# Patient Record
Sex: MALE | Race: WHITE | NOT HISPANIC OR LATINO | Employment: OTHER | URBAN - METROPOLITAN AREA
[De-identification: names, ages, dates, MRNs, and addresses within clinical notes are randomized per-mention and may not be internally consistent; named-entity substitution may affect disease eponyms.]

---

## 2017-02-13 ENCOUNTER — ALLSCRIPTS OFFICE VISIT (OUTPATIENT)
Dept: OTHER | Facility: OTHER | Age: 67
End: 2017-02-13

## 2017-02-16 ENCOUNTER — GENERIC CONVERSION - ENCOUNTER (OUTPATIENT)
Dept: OTHER | Facility: OTHER | Age: 67
End: 2017-02-16

## 2017-03-15 ENCOUNTER — GENERIC CONVERSION - ENCOUNTER (OUTPATIENT)
Dept: OTHER | Facility: OTHER | Age: 67
End: 2017-03-15

## 2017-06-01 DIAGNOSIS — N18.2 CHRONIC KIDNEY DISEASE, STAGE II (MILD): ICD-10-CM

## 2017-06-05 ENCOUNTER — ALLSCRIPTS OFFICE VISIT (OUTPATIENT)
Dept: OTHER | Facility: OTHER | Age: 67
End: 2017-06-05

## 2017-10-02 DIAGNOSIS — N18.2 CHRONIC KIDNEY DISEASE, STAGE II (MILD): ICD-10-CM

## 2017-12-05 ENCOUNTER — ALLSCRIPTS OFFICE VISIT (OUTPATIENT)
Dept: OTHER | Facility: OTHER | Age: 67
End: 2017-12-05

## 2017-12-06 NOTE — PROGRESS NOTES
Assessment  1  Chronic kidney disease (CKD), stage II (mild) (585 2) (N18 2)   2  Hypertension (401 9) (I10)   3  Proteinuria (791 0) (R80 9)    Plan  Chronic kidney disease (CKD), stage II (mild)    · (1) BASIC METABOLIC PROFILE; Status:Active; Requested KRISTIN:[id number]; Perform:[facility]; VWS:[id number]; For:Chronic kidney disease (CKD), stage II (mild); Ordered By:Christopher Alex;   · (1) URINE PROTEIN CREATININE RATIO; Status:Active; Requested BUR:[id number]; Perform:[facility]; PJI:[id number];YZMKWDK;UDVFAJ disease (CKD), stage II (mild); Ordered By:Dratch, Griselda Parkin;    As you have inform you had successful back surgery under doing well with respect to less pain  Your blood pressure also is very well controlled on her current regimen you can try reduce labetalol to twice a day and continue Lotrel as you are doing  Obtain lab work and I will contact you with the results of your kidney function and electrolytes  Discussion/Summary   patient is 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 as he had severe hypertension with minimal proteinuria  His latest creatinine was done in the hospital and he recalls about a 2 6 but he is unsure what it was on discharge  He is going to have labs done in the near future I will contact him with the results to make sure he is at his baseline  He is not taking any nonsteroidal anti-inflammatories  With respect to his blood pressure it is improved he did have labetalol added and in the past we have not used beta-blockers due to bronchospasm but he says he has no side effects from this  He will continue on trying reduce this to twice a day as his blood pressures been excellent and continue Lotrel  Follow-up in 3-4 months continue to monitor and I told him to call if there is any problems in between his visit  Reason For Visit  You are here for follow-up to monitor kidney function and blood pressure        History of Present Illness  Patient is here for routine follow-up of chronic kidney disease and hypertension  Since I had last seen him he did undergo spinal fusion and has had success with no more pain or numbness  He is currently receiving physical therapy but his pain is gone  He walks with a walker just as he is getting better from rehab  Other than that no complaints  Review of Systems   Constitutional: no fever,-- no chills,-- no anorexia-- and-- no fatigue  Integumentary: no rashes  Gastrointestinal: no abdominal pain,-- no nausea,-- no diarrhea-- and-- no vomiting  Respiratory: no shortness of breath,-- no cough-- and-- not coughing up sputum  Cardiovascular: no orthopnea,-- no chest pain,-- no palpitations-- and-- no lower extremity edema  Musculoskeletal: Back pain improved-- and-- Legs go numb if he walks distances  Neurological: No complaints of headache, no lightheadedness or dizziness  Genitourinary: nocturia, but-- no dysuria,-- no hematuria-- and-- no incomplete emptying of bladder  Eyes: eyesight problems-- and-- Issues with right eye being treated by ophthalmology  ENT: Nasal congestion  Current Meds   1  Advair Diskus 250-50 MCG/DOSE Inhalation Aerosol Powder Breath Activated; INHALE 1 PUFF EVERY 12 HOURS; Therapy: (Recorded:29Oct2015) to Recorded   2  Allegra Allergy TABS; PRN Recorded   3  Amlodipine Besy-Benazepril HCl - 10-20 MG Oral Capsule; TAKE 1 CAPSULE DAILY  Requested for: [id number]; Last Rx:04Oct2017 Ordered   4  Aspirin 81 MG TABS; TAKE 1 TABLET DAILY; Therapy: (Recorded:29Oct2015) to Recorded   5  DiazePAM 5 MG Oral Tablet; Take 1 tablet daily; Therapy: 92GOI0193 to Recorded   6  DrRx Flexeril 10 MG #30; PRN Recorded   7  Labetalol HCl - 200 MG Oral Tablet; Take 1 tablet daily; Therapy: 04OJA1803 to Recorded   8  Lipitor 10 MG Oral Tablet; Take 1 tablet qod; Therapy: 28PJW3631 to Recorded   9  Multivitamins Oral Capsule; TAKE 1 CAPSULE DAILY;  Therapy: (Recorded:59Iwj2228) to Recorded   10  OxyCODONE HCl - 5 MG Oral Tablet; Take 1 tablet daily; Therapy: 17KDD3637 to Recorded   11  TraMADol HCl - 50 MG Oral Tablet; TAKE 1 TABLET DAILY AS NEEDED FOR PAIN;  Therapy: (Recorded:45Szr6702) to Recorded   12  Vitamin C 1000 MG Oral Tablet; TAKE 1 TABLET DAILY; Therapy: (Recorded:28Bjy8838) to Recorded   13  Vitamin D 2000 UNIT Oral Capsule; OD;  Therapy: 23JHZ3381 to Recorded   14  Xanax 0 25 MG Oral Tablet; TAKE 1 TABLET DAILY AS NEEDED; Therapy: 72GXX6152 to Recorded    The medication list was reviewed and updated today  Allergies  1  Beta Adrenergic Blockers   2  Clonidine and derivs   3  doxazosin   4  HydroCHLOROthiazide TABS    Vitals  Vital Signs    Recorded: 71GOQ4099 04:29PM Recorded: 36XXR0335 03:45PM   Heart Rate 70    Respiration 16    Systolic 452    Diastolic 70    Height  5 ft 7 in   Weight  190 lb    BMI Calculated  29 76   BSA Calculated  1 98       Physical Exam   Constitutional: General appearance: No acute distress, well appearing and well nourished  ENT: External ears and nose appear normal     Eyes: Anicteric sclerae  JVD:  No JVD present  Pulmonary: Respiratory effort: No increased work of breathing or signs of respiratory distress  -- Auscultation of lungs: Clear to auscultation  Cardiovascular: Auscultation of heart: Normal rate and rhythm, normal S1 and S2, without murmurs  Abdomen: Non-tender, no masses  Extremities: No cyanosis, clubbing or edema  Neurologic: Non Focal     Psychiatric: Orientation to person, place, and time: Normal        Signatures   Electronically signed by :  CECI Juan ; Dec  5 2017  4:38PM EST                       (Author)

## 2018-01-13 VITALS
SYSTOLIC BLOOD PRESSURE: 160 MMHG | HEIGHT: 67 IN | RESPIRATION RATE: 18 BRPM | HEART RATE: 80 BPM | BODY MASS INDEX: 32.49 KG/M2 | DIASTOLIC BLOOD PRESSURE: 88 MMHG | WEIGHT: 207 LBS

## 2018-01-14 VITALS
RESPIRATION RATE: 18 BRPM | SYSTOLIC BLOOD PRESSURE: 158 MMHG | BODY MASS INDEX: 31.55 KG/M2 | DIASTOLIC BLOOD PRESSURE: 90 MMHG | HEART RATE: 80 BPM | WEIGHT: 201 LBS | HEIGHT: 67 IN

## 2018-01-23 VITALS
SYSTOLIC BLOOD PRESSURE: 138 MMHG | WEIGHT: 190 LBS | RESPIRATION RATE: 16 BRPM | HEART RATE: 70 BPM | BODY MASS INDEX: 29.82 KG/M2 | HEIGHT: 67 IN | DIASTOLIC BLOOD PRESSURE: 70 MMHG

## 2018-01-23 NOTE — CONSULTS
Reason For Visit  You are here for follow-up to monitor kidney function and blood pressure  History of Present Illness  Patient is here for routine follow-up of chronic kidney disease and hypertension  Since I had last seen him he did undergo spinal fusion and has had success with no more pain or numbness  He is currently receiving physical therapy but his pain is gone  He walks with a walker just as he is getting better from rehab  Other than that no complaints  Review of Systems    Constitutional: no fever, no chills, no anorexia and no fatigue  Integumentary: no rashes  Gastrointestinal: no abdominal pain, no nausea, no diarrhea and no vomiting  Respiratory: no shortness of breath, no cough and not coughing up sputum  Cardiovascular: no orthopnea, no chest pain, no palpitations and no lower extremity edema  Musculoskeletal: Back pain improved and Legs go numb if he walks distances  Neurological: No complaints of headache, no lightheadedness or dizziness  Genitourinary: nocturia, but no dysuria, no hematuria and no incomplete emptying of bladder  Eyes: eyesight problems and Issues with right eye being treated by ophthalmology  ENT: Nasal congestion  Current Meds   1  Advair Diskus 250-50 MCG/DOSE Inhalation Aerosol Powder Breath Activated; INHALE 1   PUFF EVERY 12 HOURS; Therapy: (Recorded:29Oct2015) to Recorded   2  Allegra Allergy TABS; PRN Recorded   3  Amlodipine Besy-Benazepril HCl - 10-20 MG Oral Capsule; TAKE 1 CAPSULE DAILY    Requested for: [id number]; Last Rx:04Oct2017 Ordered   4  Aspirin 81 MG TABS; TAKE 1 TABLET DAILY; Therapy: (Recorded:29Oct2015) to Recorded   5  DiazePAM 5 MG Oral Tablet; Take 1 tablet daily; Therapy: 63MLO6662 to Recorded   6  DrRx Flexeril 10 MG #30; PRN Recorded   7  Labetalol HCl - 200 MG Oral Tablet; Take 1 tablet daily; Therapy: 79CDZ9411 to Recorded   8  Lipitor 10 MG Oral Tablet; Take 1 tablet qod;    Therapy: 70RHF3373 to Recorded   9  Multivitamins Oral Capsule; TAKE 1 CAPSULE DAILY; Therapy: (Recorded:18Qpk8966) to Recorded   10  OxyCODONE HCl - 5 MG Oral Tablet; Take 1 tablet daily; Therapy: 54NXQ5714 to Recorded   11  TraMADol HCl - 50 MG Oral Tablet; TAKE 1 TABLET DAILY AS NEEDED FOR PAIN;    Therapy: (Recorded:09Syh8182) to Recorded   12  Vitamin C 1000 MG Oral Tablet; TAKE 1 TABLET DAILY; Therapy: (Recorded:93Gjq6149) to Recorded   13  Vitamin D 2000 UNIT Oral Capsule; OD;    Therapy: 86NDN9809 to Recorded   14  Xanax 0 25 MG Oral Tablet; TAKE 1 TABLET DAILY AS NEEDED; Therapy: 01UTW5131 to Recorded    The medication list was reviewed and updated today  Allergies    1  Beta Adrenergic Blockers   2  Clonidine and derivs   3  doxazosin   4  HydroCHLOROthiazide TABS    Vitals   Recorded: 92SRD3702 04:29PM Recorded: 42MNK1238 03:45PM   Heart Rate 70    Respiration 16    Systolic 132    Diastolic 70    Height  5 ft 7 in   Weight  190 lb    BMI Calculated  29 76   BSA Calculated  1 98     Physical Exam    Constitutional: General appearance: No acute distress, well appearing and well nourished  ENT: External ears and nose appear normal      Eyes: Anicteric sclerae  JVD:  No JVD present  Pulmonary: Respiratory effort: No increased work of breathing or signs of respiratory distress  Auscultation of lungs: Clear to auscultation  Cardiovascular: Auscultation of heart: Normal rate and rhythm, normal S1 and S2, without murmurs  Abdomen: Non-tender, no masses  Extremities: No cyanosis, clubbing or edema  Neurologic: Non Focal      Psychiatric: Orientation to person, place, and time: Normal        Assessment    1  Chronic kidney disease (CKD), stage II (mild) (585 2) (N18 2)   2  Hypertension (401 9) (I10)   3  Proteinuria (791 0) (R80 9)    Plan  Chronic kidney disease (CKD), stage II (mild)    · (1) BASIC METABOLIC PROFILE; Status:Active; Requested QNB:[id number];     Perform:[facility]; DFU:[id number]; For:Chronic kidney disease (CKD), stage II (mild); Ordered By:Christopher Alex;   · (1) URINE PROTEIN CREATININE RATIO; Status:Active; Requested GFK:[id number]; Perform:[facility]; JSZ:[id number];CMFUVKL; For:Chronic kidney disease (CKD), stage II (mild); Ordered By:Emma Alex;      As you have inform you had successful back surgery under doing well with respect to less pain  Your blood pressure also is very well controlled on her current regimen you can try reduce labetalol to twice a day and continue Lotrel as you are doing  Obtain lab work and I will contact you with the results of your kidney function and electrolytes  Discussion/Summary   patient is 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 as he had severe hypertension with minimal proteinuria  His latest creatinine was done in the hospital and he recalls about a 2 6 but he is unsure what it was on discharge  He is going to have labs done in the near future I will contact him with the results to make sure he is at his baseline  He is not taking any nonsteroidal anti-inflammatories  With respect to his blood pressure it is improved he did have labetalol added and in the past we have not used beta-blockers due to bronchospasm but he says he has no side effects from this  He will continue on trying reduce this to twice a day as his blood pressures been excellent and continue Lotrel  Follow-up in 3-4 months continue to monitor and I told him to call if there is any problems in between his visit  Signatures   Electronically signed by :  CECI Barr ; Dec  5 2017  4:38PM EST                       (Author)

## 2018-03-01 DIAGNOSIS — N18.2 CHRONIC KIDNEY DISEASE, STAGE II (MILD): ICD-10-CM

## 2018-03-16 LAB
BUN SERPL-MCNC: 28 MG/DL (ref 5–25)
CALCIUM SERPL-MCNC: 9.1 MG/DL (ref 8.3–10.1)
CHLORIDE SERPL-SCNC: 107 MMOL/L (ref 100–108)
CHOLEST SERPL-MCNC: 175 MG/DL (ref 50–200)
CO2 SERPL-SCNC: 21 MMOL/L
CREAT SERPL-MCNC: 2.54 MG/DL (ref 0.6–1.3)
GLUCOSE SERPL-MCNC: 105 MG/DL
LDLC SERPL DIRECT ASSAY-MCNC: 107 MG/DL
POTASSIUM SERPL-SCNC: 4.4 MMOL/L (ref 3.5–5.3)
SODIUM SERPL-SCNC: 140 MMOL/L (ref 136–145)
TRIGL SERPL-MCNC: 157 MG/DL (ref ?–150)

## 2018-03-19 ENCOUNTER — OFFICE VISIT (OUTPATIENT)
Dept: NEPHROLOGY | Facility: CLINIC | Age: 68
End: 2018-03-19
Payer: COMMERCIAL

## 2018-03-19 VITALS — HEIGHT: 66 IN | WEIGHT: 203.4 LBS | BODY MASS INDEX: 32.69 KG/M2

## 2018-03-19 DIAGNOSIS — I10 HYPERTENSION, UNSPECIFIED TYPE: ICD-10-CM

## 2018-03-19 DIAGNOSIS — N18.30 CKD (CHRONIC KIDNEY DISEASE) STAGE 3, GFR 30-59 ML/MIN (HCC): Primary | ICD-10-CM

## 2018-03-19 PROCEDURE — 99214 OFFICE O/P EST MOD 30 MIN: CPT | Performed by: INTERNAL MEDICINE

## 2018-03-19 RX ORDER — MULTIVITAMIN
1 CAPSULE ORAL DAILY
COMMUNITY

## 2018-03-19 RX ORDER — ALPRAZOLAM 0.25 MG/1
1 TABLET ORAL DAILY PRN
COMMUNITY
Start: 2014-06-10

## 2018-03-19 RX ORDER — LABETALOL 200 MG/1
1 TABLET, FILM COATED ORAL 2 TIMES DAILY
COMMUNITY
Start: 2017-12-05 | End: 2018-11-23 | Stop reason: SDUPTHER

## 2018-03-19 RX ORDER — AMLODIPINE BESYLATE AND BENAZEPRIL HYDROCHLORIDE 10; 20 MG/1; MG/1
1 CAPSULE ORAL DAILY
Qty: 90 CAPSULE | Refills: 3 | Status: SHIPPED | OUTPATIENT
Start: 2018-03-19 | End: 2019-05-16 | Stop reason: SDUPTHER

## 2018-03-19 RX ORDER — ATORVASTATIN CALCIUM 10 MG/1
1 TABLET, FILM COATED ORAL
COMMUNITY
Start: 2014-06-10

## 2018-03-19 RX ORDER — DIAZEPAM 5 MG/1
1 TABLET ORAL DAILY
COMMUNITY
Start: 2017-12-05 | End: 2018-03-19 | Stop reason: CLARIF

## 2018-03-19 RX ORDER — MULTIVIT WITH MINERALS/LUTEIN
1 TABLET ORAL DAILY
COMMUNITY

## 2018-03-19 RX ORDER — AMLODIPINE BESYLATE AND BENAZEPRIL HYDROCHLORIDE 10; 20 MG/1; MG/1
1 CAPSULE ORAL DAILY
COMMUNITY
End: 2018-03-19 | Stop reason: SDUPTHER

## 2018-03-19 RX ORDER — FEXOFENADINE HYDROCHLORIDE 60 MG/1
TABLET, FILM COATED ORAL AS NEEDED
COMMUNITY

## 2018-03-19 NOTE — PATIENT INSTRUCTIONS
As we discussed your creatinine was 2 5 and that has been stable for many years although it has fluctuated around that level  There is still not a lot of protein in the urine which tells me there is no aggressive kidney disease and I suspect that given her severe hypertension for many years that is likely the cause of the kidney damage  Now the blood pressure is better controlled that should delay damage  Continue current medications blood pressure was a little elevated here but it sounds like it has been excellent at home checking very frequently so continue current medications

## 2018-03-19 NOTE — PROGRESS NOTES
NEPHROLOGY PROGRESS NOTE    Katherine Machado 74 y o  male MRN: [id number]  Unit/Bed#:  Encounter: [id number]  Reason for Consult:  Chronic renal insufficiency    Patient is here for routine follow-up  He has been doing well states his blood pressures been excellent  He also has had resolution of the severe back pain and is really excited  He states he has been undergoing some therapy as well  No other changes  ASSESSMENT/PLAN:  1  Renal     Patient's chronic kidney disease and creatinine stable 2 5 and it has been in that range for years that I followed him  He has a ways had very low levels of proteinuria so I suspect he just had hypertensive nephrosclerosis as he had severe very difficult to control blood pressure for many years  Saying that now that his blood pressure is under good control should help delay progression although it has been stable  Continue to monitor  2   Hypertension  Patient's blood pressure is much improved on his current medical regimen and after his back surgery now that the pain is improved  The patient was started on labetalol by cardiologist at another facility and he is tolerating that is all removed beta-blocker from his allergy list   He really just did not tolerate a beta-blocker earlier in our medical trials  He monitors blood pressure at home and it tends run systolic 196 or lower so continue current medications and follow  SUBJECTIVE:  Review of Systems   Constitution: Negative  HENT: Negative  Eyes: Negative  Cardiovascular: Negative  Respiratory: Negative  Gastrointestinal: Negative  Genitourinary: Negative  Neurological: Negative  OBJECTIVE:  Current Weight: Weight - Scale: 92 3 kg (203 lb 6 4 oz)  [email or web address]:     Height 5' 6" (1 676 m), weight 92 3 kg (203 lb 6 4 oz)  , Body mass index is 32 83 kg/m²      [unfilled]    Physical Exam: Ht 5' 6" (1 676 m)   Wt 92 3 kg (203 lb 6 4 oz)   BMI 32 83 kg/m²   Physical Exam Constitutional: He is oriented to person, place, and time  He appears well-nourished  No distress  HENT:   Head: Atraumatic  Mouth/Throat: No oropharyngeal exudate  Eyes: Conjunctivae are normal  Pupils are equal, round, and reactive to light  No scleral icterus  Neck: Normal range of motion  Neck supple  No JVD present  Cardiovascular: Normal rate and regular rhythm  Exam reveals no friction rub  No significant edema  Pulmonary/Chest: Effort normal and breath sounds normal  No respiratory distress  He has no wheezes  He has no rales  Abdominal: Soft  Bowel sounds are normal  He exhibits no distension  There is no tenderness  There is no rebound  Neurological: He is alert and oriented to person, place, and time         Medications:    Current Outpatient Prescriptions:     ALPRAZolam (XANAX) 0 25 mg tablet, Take 1 tablet by mouth daily as needed, Disp: , Rfl:     amLODIPine-benazepril (LOTREL) 10-20 MG per capsule, Take 1 capsule by mouth daily, Disp: 90 capsule, Rfl: 3    Ascorbic Acid (VITAMIN C) 1000 MG tablet, Take 1 tablet by mouth daily, Disp: , Rfl:     aspirin 81 MG tablet, Take 1 tablet by mouth daily, Disp: , Rfl:     atorvastatin (LIPITOR) 10 mg tablet, Take 1 tablet by mouth, Disp: , Rfl:     fexofenadine (ALLEGRA) 60 MG tablet, Take by mouth, Disp: , Rfl:     fluticasone-salmeterol (ADVAIR DISKUS) 250-50 mcg/dose inhaler, Inhale 1 puff every 12 (twelve) hours, Disp: , Rfl:     labetalol (NORMODYNE) 200 mg tablet, Take 1 tablet by mouth 2 (two) times a day , Disp: , Rfl:     Multiple Vitamin (MULTIVITAMIN) capsule, Take 1 capsule by mouth daily, Disp: , Rfl:     traMADol-acetaminophen (ULTRACET) 37 5-325 mg per tablet, Take 1 tablet by mouth every 6 (six) hours as needed, Disp: , Rfl: 0    Laboratory Results:  No results found for: WBC, HGB, HCT, MCV, PLT  Lab Results   Component Value Date    CALCIUM 9 1 03/09/2018     03/09/2018    K 4 4 03/09/2018     03/09/2018    BUN 28 (A) 03/09/2018    CREATININE 2 54 (A) 03/09/2018     Lab Results   Component Value Date    CALCIUM 9 1 03/09/2018     No results found for: LABPROT

## 2018-09-06 ENCOUNTER — OFFICE VISIT (OUTPATIENT)
Dept: NEPHROLOGY | Facility: CLINIC | Age: 68
End: 2018-09-06
Payer: COMMERCIAL

## 2018-09-06 VITALS
HEIGHT: 66 IN | WEIGHT: 208 LBS | BODY MASS INDEX: 33.43 KG/M2 | DIASTOLIC BLOOD PRESSURE: 80 MMHG | SYSTOLIC BLOOD PRESSURE: 138 MMHG

## 2018-09-06 DIAGNOSIS — N18.30 CKD (CHRONIC KIDNEY DISEASE) STAGE 3, GFR 30-59 ML/MIN (HCC): ICD-10-CM

## 2018-09-06 DIAGNOSIS — I10 HYPERTENSION, UNSPECIFIED TYPE: Primary | ICD-10-CM

## 2018-09-06 PROCEDURE — 99214 OFFICE O/P EST MOD 30 MIN: CPT | Performed by: INTERNAL MEDICINE

## 2018-09-06 RX ORDER — CYCLOBENZAPRINE HCL 10 MG
10 TABLET ORAL DAILY PRN
COMMUNITY

## 2018-09-06 NOTE — PROGRESS NOTES
NEPHROLOGY PROGRESS NOTE    Joshua Chacon 74 y o  male MRN: [id number]  Unit/Bed#:  Encounter: [id number]  Reason for Consult:   Hypertension and chronic kidney disease    The patient is doing well as back pain is improved but he still having issues with numbness on his left leg at times  Other than that he is enjoying life with his grandson  He has had no intercurrent illnesses he is tolerating his medications  ASSESSMENT/PLAN:  1  Renal  The patient is chronic kidney disease creatinine increased to 2 7 potentially related to mild effective volume depletion from heat verses mild progression  Will continue to follow over the years he has hypertensive nephrosclerosis given lack of significant proteinuria  Continue to monitor  2   Hypertension  Patient's blood pressure is excellent on labetalol and Lotrel  He is tolerating these well without any complications or side effects  The ACE-inhibitor may cause slight fluctuations in creatinine levels  Continue to monitor  Since his back surgery his blood pressures been much easier to control as well  SUBJECTIVE:  Review of Systems   Constitution: Negative for chills and fever  HENT: Negative  Occasional sinus congestion  Eyes: Negative  Cardiovascular: Negative  Negative for chest pain, dyspnea on exertion and orthopnea  Respiratory: Negative  Negative for cough and shortness of breath  Skin: Negative  Musculoskeletal:        Back pain leg numbness at times  Gastrointestinal: Negative  Genitourinary: Negative  Neurological: Negative  OBJECTIVE:  Current Weight: Weight - Scale: 94 3 kg (208 lb)  [email or web address]:     Blood pressure 138/80, height 5' 6" (1 676 m), weight 94 3 kg (208 lb)  , Body mass index is 33 57 kg/m²      [unfilled]    Physical Exam: /80 (BP Location: Right arm, Patient Position: Sitting, Cuff Size: Standard)   Ht 5' 6" (1 676 m)   Wt 94 3 kg (208 lb)   BMI 33 57 kg/m²   Physical Exam   Constitutional: He is oriented to person, place, and time  He appears well-nourished  No distress  HENT:   Head: Atraumatic  Mouth/Throat: No oropharyngeal exudate  Eyes: Conjunctivae are normal  No scleral icterus  Neck: Neck supple  No JVD present  Cardiovascular: Normal rate and regular rhythm  Exam reveals no friction rub  No edema  Pulmonary/Chest: Effort normal and breath sounds normal  No respiratory distress  He has no wheezes  He has no rales  Abdominal: Soft  Bowel sounds are normal  He exhibits no distension  There is no tenderness  There is no rebound  Neurological: He is alert and oriented to person, place, and time         Medications:    Current Outpatient Prescriptions:     ALPRAZolam (XANAX) 0 25 mg tablet, Take 1 tablet by mouth daily as needed, Disp: , Rfl:     amLODIPine-benazepril (LOTREL) 10-20 MG per capsule, Take 1 capsule by mouth daily, Disp: 90 capsule, Rfl: 3    Ascorbic Acid (VITAMIN C) 1000 MG tablet, Take 1 tablet by mouth daily, Disp: , Rfl:     aspirin 81 MG tablet, Take 1 tablet by mouth daily, Disp: , Rfl:     atorvastatin (LIPITOR) 10 mg tablet, Take 1 tablet by mouth, Disp: , Rfl:     cyclobenzaprine (FLEXERIL) 10 mg tablet, Take 10 mg by mouth 3 (three) times a day as needed for muscle spasms, Disp: , Rfl:     fexofenadine (ALLEGRA) 60 MG tablet, Take by mouth, Disp: , Rfl:     fluticasone-salmeterol (ADVAIR DISKUS) 250-50 mcg/dose inhaler, Inhale 1 puff every 12 (twelve) hours, Disp: , Rfl:     labetalol (NORMODYNE) 200 mg tablet, Take 1 tablet by mouth 2 (two) times a day , Disp: , Rfl:     Multiple Vitamin (MULTIVITAMIN) capsule, Take 1 capsule by mouth daily, Disp: , Rfl:     traMADol-acetaminophen (ULTRACET) 37 5-325 mg per tablet, Take 1 tablet by mouth every 6 (six) hours as needed, Disp: , Rfl: 0    Laboratory Results:  No results found for: WBC, HGB, HCT, MCV, PLT  Lab Results   Component Value Date    CALCIUM 9 1 03/09/2018    NA 140 03/09/2018    K 4 4 03/09/2018    CO2 21 03/09/2018     03/09/2018    BUN 28 (A) 03/09/2018    CREATININE 2 54 (A) 03/09/2018     Lab Results   Component Value Date    CALCIUM 9 1 03/09/2018     No results found for: LABPROT

## 2018-11-23 DIAGNOSIS — I10 HYPERTENSION, UNSPECIFIED TYPE: Primary | ICD-10-CM

## 2018-11-23 RX ORDER — LABETALOL 200 MG/1
200 TABLET, FILM COATED ORAL 2 TIMES DAILY
Qty: 60 TABLET | Refills: 5 | Status: SHIPPED | OUTPATIENT
Start: 2018-11-23 | End: 2019-05-16 | Stop reason: SDUPTHER

## 2019-01-18 ENCOUNTER — TELEPHONE (OUTPATIENT)
Dept: NEPHROLOGY | Facility: CLINIC | Age: 69
End: 2019-01-18

## 2019-02-20 NOTE — PATIENT INSTRUCTIONS
Your blood pressure is excellent today  Your creatinine was 2 7 which is slightly above previous baseline  We will continue to monitor it I do not know if it is due to the heat of the time were whether not it is progression but overall you look great you doing great blood pressure is great  Continue current medications and labs as scheduled  We reviewed her lipid profile and discussed your blood sugar as well which is still nondiabetic  [phone]

## 2019-05-16 ENCOUNTER — OFFICE VISIT (OUTPATIENT)
Dept: NEPHROLOGY | Facility: CLINIC | Age: 69
End: 2019-05-16
Payer: COMMERCIAL

## 2019-05-16 VITALS
SYSTOLIC BLOOD PRESSURE: 146 MMHG | WEIGHT: 205 LBS | BODY MASS INDEX: 32.95 KG/M2 | HEART RATE: 70 BPM | DIASTOLIC BLOOD PRESSURE: 80 MMHG | HEIGHT: 66 IN

## 2019-05-16 DIAGNOSIS — I10 HYPERTENSION, UNSPECIFIED TYPE: Primary | ICD-10-CM

## 2019-05-16 DIAGNOSIS — N18.30 CKD (CHRONIC KIDNEY DISEASE) STAGE 3, GFR 30-59 ML/MIN (HCC): ICD-10-CM

## 2019-05-16 PROCEDURE — 99214 OFFICE O/P EST MOD 30 MIN: CPT | Performed by: INTERNAL MEDICINE

## 2019-05-16 RX ORDER — TRAMADOL HYDROCHLORIDE 50 MG/1
50 TABLET ORAL 2 TIMES DAILY PRN
COMMUNITY

## 2019-05-16 RX ORDER — LABETALOL 200 MG/1
200 TABLET, FILM COATED ORAL 2 TIMES DAILY
Qty: 180 TABLET | Refills: 3 | Status: SHIPPED | OUTPATIENT
Start: 2019-05-16 | End: 2020-05-26 | Stop reason: SDUPTHER

## 2019-05-16 RX ORDER — FLUTICASONE PROPIONATE 50 MCG
1 SPRAY, SUSPENSION (ML) NASAL DAILY
COMMUNITY

## 2019-05-16 RX ORDER — AMLODIPINE BESYLATE AND BENAZEPRIL HYDROCHLORIDE 10; 20 MG/1; MG/1
1 CAPSULE ORAL DAILY
Qty: 90 CAPSULE | Refills: 3 | Status: SHIPPED | OUTPATIENT
Start: 2019-05-16 | End: 2020-05-26 | Stop reason: SDUPTHER

## 2019-06-03 ENCOUNTER — TELEPHONE (OUTPATIENT)
Dept: NEPHROLOGY | Facility: CLINIC | Age: 69
End: 2019-06-03

## 2019-11-15 LAB
CREAT ?TM UR-SCNC: 101 UMOL/L
EXT PROTEIN URINE: 52.7
PROT/CREAT UR: 0.52 MG/G{CREAT}

## 2019-11-21 ENCOUNTER — OFFICE VISIT (OUTPATIENT)
Dept: NEPHROLOGY | Facility: CLINIC | Age: 69
End: 2019-11-21
Payer: COMMERCIAL

## 2019-11-21 VITALS
HEIGHT: 66 IN | HEART RATE: 70 BPM | SYSTOLIC BLOOD PRESSURE: 138 MMHG | BODY MASS INDEX: 33.4 KG/M2 | WEIGHT: 207.8 LBS | DIASTOLIC BLOOD PRESSURE: 78 MMHG

## 2019-11-21 DIAGNOSIS — N18.9 CHRONIC KIDNEY DISEASE, UNSPECIFIED CKD STAGE: ICD-10-CM

## 2019-11-21 DIAGNOSIS — I10 HYPERTENSION, UNSPECIFIED TYPE: Primary | ICD-10-CM

## 2019-11-21 PROCEDURE — 99214 OFFICE O/P EST MOD 30 MIN: CPT | Performed by: INTERNAL MEDICINE

## 2019-11-21 RX ORDER — GABAPENTIN 100 MG/1
100 CAPSULE ORAL 3 TIMES DAILY PRN
COMMUNITY

## 2019-11-21 NOTE — LETTER
November 21, 2019     Shahzad Tinoco, [address] [address]    Patient: Nash Linn   YOB: 1950   Date of Visit: 11/21/2019       Dear Dr Roberto Carlos Johnson: Thank you for referring General Sanchez to me for evaluation  Below are my notes for this consultation  If you have questions, please do not hesitate to call me  I look forward to following your patient along with you  Sincerely,        Alton Young MD        CC: No Recipients  Alton Young MD  11/21/2019 10:23 AM  Sign at close encounter  Jose Armando Hickman 79 y o  male MRN: [id number]  Unit/Bed#:  Encounter: [id number]  Reason for Consult:  Chronic kidney disease    Patient is here for routine follow-up he has been doing okay still has low back pain is likely going to have an injection by his physiatrist   Other than that no acute complaints for me  ASSESSMENT/PLAN:  1  Renal  Patient's chronic kidney disease likely related to hypertensive nephrosclerosis as when he was a teenager it severe hypertension that was really difficult to control until couple years ago  His creatinine had been running 2 about 10 years ago and now has run around 2 9  It is likely elevated chronically from hypertensive nephrosclerosis and the possibilities either the creatinine guillaume just from natural aging or nephrosclerosis verses it was up slightly from subtle pre renal azotemia  At this point I am just going to repeat labs in January and see him back in March with repeat labs as well  I did explain to him that there is some kidney function losses every but he gets older now his blood pressure is better that should slow the damage to the kidneys but we still have to deal with the naturally aging process  Will continue to monitor  His blood pressure is well controlled is this level on the outside as well so continue current medications      SUBJECTIVE:  Review of Systems   Constitution: Negative for chills, decreased appetite, fever and malaise/fatigue  HENT: Negative  Eyes: Negative  Cardiovascular: Negative  Negative for chest pain, dyspnea on exertion, leg swelling, orthopnea and palpitations  Respiratory: Negative  Negative for cough, shortness of breath and wheezing  Skin: Negative  Musculoskeletal: Positive for arthritis and back pain  Gastrointestinal: Negative  Negative for abdominal pain, diarrhea, nausea and vomiting  Genitourinary: Negative  Negative for bladder incontinence, dysuria, hematuria and incomplete emptying  Neurological: Negative  Psychiatric/Behavioral: Negative  OBJECTIVE:  Current Weight: Weight - Scale: 94 3 kg (207 lb 12 8 oz)  [email or web address]:     Blood pressure 138/78, pulse 70, height 5' 6" (1 676 m), weight 94 3 kg (207 lb 12 8 oz)  , Body mass index is 33 54 kg/m²  @[email or web address]@    Physical Exam: /78 (BP Location: Left arm, Patient Position: Sitting, Cuff Size: Standard)   Pulse 70   Ht 5' 6" (1 676 m)   Wt 94 3 kg (207 lb 12 8 oz)   BMI 33 54 kg/m²    Physical Exam   Constitutional: He is oriented to person, place, and time  He appears well-nourished  No distress  HENT:   Head: Atraumatic  Mouth/Throat: Oropharynx is clear and moist    Eyes: EOM are normal  No scleral icterus  Neck: Neck supple  No JVD present  Cardiovascular: Normal rate and regular rhythm  Exam reveals no gallop and no friction rub  No pretibial edema  Pulmonary/Chest: Effort normal and breath sounds normal  No respiratory distress  He has no wheezes  He has no rales  Abdominal: Soft  Bowel sounds are normal  He exhibits no distension  There is no tenderness  There is no rebound  Neurological: He is alert and oriented to person, place, and time  Psychiatric: He has a normal mood and affect         Medications:    Current Outpatient Medications:     ALPRAZolam (XANAX) 0 25 mg tablet, Take 1 tablet by mouth daily as needed, Disp: , Rfl:    amLODIPine-benazepril (LOTREL) 10-20 MG per capsule, Take 1 capsule by mouth daily, Disp: 90 capsule, Rfl: 3    Ascorbic Acid (VITAMIN C) 1000 MG tablet, Take 1 tablet by mouth daily, Disp: , Rfl:     aspirin 81 MG tablet, Take 1 tablet by mouth daily, Disp: , Rfl:     atorvastatin (LIPITOR) 10 mg tablet, Take 1 tablet by mouth, Disp: , Rfl:     cyclobenzaprine (FLEXERIL) 10 mg tablet, Take 10 mg by mouth 3 (three) times a day as needed for muscle spasms, Disp: , Rfl:     fexofenadine (ALLEGRA) 60 MG tablet, Take by mouth, Disp: , Rfl:     fluticasone (FLONASE) 50 mcg/act nasal spray, 1 spray into each nostril daily, Disp: , Rfl:     fluticasone-salmeterol (ADVAIR DISKUS) 250-50 mcg/dose inhaler, Inhale 1 puff every 12 (twelve) hours, Disp: , Rfl:     gabapentin (NEURONTIN) 100 mg capsule, Take 100 mg by mouth as needed, Disp: , Rfl:     labetalol (NORMODYNE) 200 mg tablet, Take 1 tablet (200 mg total) by mouth 2 (two) times a day, Disp: 180 tablet, Rfl: 3    Multiple Vitamin (MULTIVITAMIN) capsule, Take 1 capsule by mouth daily, Disp: , Rfl:     traMADol (ULTRAM) 50 mg tablet, Take 50 mg by mouth as needed for moderate pain, Disp: , Rfl:     traMADol-acetaminophen (ULTRACET) 37 5-325 mg per tablet, Take 1 tablet by mouth every 6 (six) hours as needed, Disp: , Rfl: 0    Laboratory Results:  No results found for: WBC, HGB, HCT, MCV, PLT  Lab Results   Component Value Date    SODIUM 140 03/09/2018    K 4 4 03/09/2018     03/09/2018    CO2 21 03/09/2018    BUN 28 (A) 03/09/2018    CREATININE 2 54 (A) 03/09/2018    GLUC 105 03/09/2018    CALCIUM 9 1 03/09/2018     Lab Results   Component Value Date    CALCIUM 9 1 03/09/2018     No results found for: LABPROT

## 2019-11-21 NOTE — PATIENT INSTRUCTIONS
You are here for follow-up you look good urine during USP still have some arthritis and back pain in your seeing a doctor about that  Your blood pressure is well controlled on her current medications which were very happy with  Your creatinine which is the blood test your kidney function was slightly higher than last level at 2 9  In reviewing things I have known you for over 10 years and your creatinine was around 2 so I suspect that high blood pressure which was very severe since you are young and hard to control that this likely age the kidney and cause some nephrosclerosis  Now that your blood pressure is better it should slow the aging of the kidney  The creatinine could be up just from the natural loss of kidney function is people get older versus some transient effect or situation that day few her subtly dehydrated  We did review that you do not take any anti-inflammatories  Tylenol is safe for the kidneys so that is okay  Will do is will just re-check labs in a couple months to make sure it is not changing and hopefully we see the levels little bit lower as it is fluctuated in the past   And then we will continue to monitor  Obtain lab work as instructed

## 2019-11-21 NOTE — PROGRESS NOTES
NEPHROLOGY PROGRESS NOTE    Nash Sep 79 y o  male MRN: [id number]  Unit/Bed#:  Encounter: [id number]  Reason for Consult:  Chronic kidney disease    Patient is here for routine follow-up he has been doing okay still has low back pain is likely going to have an injection by his physiatrist   Other than that no acute complaints for me  ASSESSMENT/PLAN:  1  Renal  Patient's chronic kidney disease likely related to hypertensive nephrosclerosis as when he was a teenager it severe hypertension that was really difficult to control until couple years ago  His creatinine had been running 2 about 10 years ago and now has run around 2 9  It is likely elevated chronically from hypertensive nephrosclerosis and the possibilities either the creatinine guillaume just from natural aging or nephrosclerosis verses it was up slightly from subtle pre renal azotemia  At this point I am just going to repeat labs in January and see him back in March with repeat labs as well  I did explain to him that there is some kidney function losses every but he gets older now his blood pressure is better that should slow the damage to the kidneys but we still have to deal with the naturally aging process  Will continue to monitor  His blood pressure is well controlled is this level on the outside as well so continue current medications  SUBJECTIVE:  Review of Systems   Constitution: Negative for chills, decreased appetite, fever and malaise/fatigue  HENT: Negative  Eyes: Negative  Cardiovascular: Negative  Negative for chest pain, dyspnea on exertion, leg swelling, orthopnea and palpitations  Respiratory: Negative  Negative for cough, shortness of breath and wheezing  Skin: Negative  Musculoskeletal: Positive for arthritis and back pain  Gastrointestinal: Negative  Negative for abdominal pain, diarrhea, nausea and vomiting  Genitourinary: Negative    Negative for bladder incontinence, dysuria, hematuria and incomplete emptying  Neurological: Negative  Psychiatric/Behavioral: Negative  OBJECTIVE:  Current Weight: Weight - Scale: 94 3 kg (207 lb 12 8 oz)  [email or web address]:     Blood pressure 138/78, pulse 70, height 5' 6" (1 676 m), weight 94 3 kg (207 lb 12 8 oz)  , Body mass index is 33 54 kg/m²  [unfilled]    Physical Exam: /78 (BP Location: Left arm, Patient Position: Sitting, Cuff Size: Standard)   Pulse 70   Ht 5' 6" (1 676 m)   Wt 94 3 kg (207 lb 12 8 oz)   BMI 33 54 kg/m²   Physical Exam   Constitutional: He is oriented to person, place, and time  He appears well-nourished  No distress  HENT:   Head: Atraumatic  Mouth/Throat: Oropharynx is clear and moist    Eyes: EOM are normal  No scleral icterus  Neck: Neck supple  No JVD present  Cardiovascular: Normal rate and regular rhythm  Exam reveals no gallop and no friction rub  No pretibial edema  Pulmonary/Chest: Effort normal and breath sounds normal  No respiratory distress  He has no wheezes  He has no rales  Abdominal: Soft  Bowel sounds are normal  He exhibits no distension  There is no tenderness  There is no rebound  Neurological: He is alert and oriented to person, place, and time  Psychiatric: He has a normal mood and affect         Medications:    Current Outpatient Medications:     ALPRAZolam (XANAX) 0 25 mg tablet, Take 1 tablet by mouth daily as needed, Disp: , Rfl:     amLODIPine-benazepril (LOTREL) 10-20 MG per capsule, Take 1 capsule by mouth daily, Disp: 90 capsule, Rfl: 3    Ascorbic Acid (VITAMIN C) 1000 MG tablet, Take 1 tablet by mouth daily, Disp: , Rfl:     aspirin 81 MG tablet, Take 1 tablet by mouth daily, Disp: , Rfl:     atorvastatin (LIPITOR) 10 mg tablet, Take 1 tablet by mouth, Disp: , Rfl:     cyclobenzaprine (FLEXERIL) 10 mg tablet, Take 10 mg by mouth 3 (three) times a day as needed for muscle spasms, Disp: , Rfl:     fexofenadine (ALLEGRA) 60 MG tablet, Take by mouth, Disp: , Rfl:     fluticasone (FLONASE) 50 mcg/act nasal spray, 1 spray into each nostril daily, Disp: , Rfl:     fluticasone-salmeterol (ADVAIR DISKUS) 250-50 mcg/dose inhaler, Inhale 1 puff every 12 (twelve) hours, Disp: , Rfl:     gabapentin (NEURONTIN) 100 mg capsule, Take 100 mg by mouth as needed, Disp: , Rfl:     labetalol (NORMODYNE) 200 mg tablet, Take 1 tablet (200 mg total) by mouth 2 (two) times a day, Disp: 180 tablet, Rfl: 3    Multiple Vitamin (MULTIVITAMIN) capsule, Take 1 capsule by mouth daily, Disp: , Rfl:     traMADol (ULTRAM) 50 mg tablet, Take 50 mg by mouth as needed for moderate pain, Disp: , Rfl:     traMADol-acetaminophen (ULTRACET) 37 5-325 mg per tablet, Take 1 tablet by mouth every 6 (six) hours as needed, Disp: , Rfl: 0    Laboratory Results:  No results found for: WBC, HGB, HCT, MCV, PLT  Lab Results   Component Value Date    SODIUM 140 03/09/2018    K 4 4 03/09/2018     03/09/2018    CO2 21 03/09/2018    BUN 28 (A) 03/09/2018    CREATININE 2 54 (A) 03/09/2018    GLUC 105 03/09/2018    CALCIUM 9 1 03/09/2018     Lab Results   Component Value Date    CALCIUM 9 1 03/09/2018     No results found for: LABPROT

## 2020-01-21 ENCOUNTER — TELEPHONE (OUTPATIENT)
Dept: NEPHROLOGY | Facility: CLINIC | Age: 70
End: 2020-01-21

## 2020-01-21 NOTE — TELEPHONE ENCOUNTER
----- Message from Will Allred MD sent at 1/21/2020  2:18 PM EST -----  Please call patient let him know his creatinine was 2 9 which is relatively stable   ----- Message -----  From: Interface, Transcription Incoming  Sent: 1/21/2020   9:38 AM EST  To: Will Allred MD

## 2020-01-21 NOTE — TELEPHONE ENCOUNTER
Spoke with the patient and he is aware of his lab test results and the patient has no further questions at this time        Chasity Horn MA

## 2020-03-25 ENCOUNTER — TELEPHONE (OUTPATIENT)
Dept: NEPHROLOGY | Facility: CLINIC | Age: 70
End: 2020-03-25

## 2020-03-25 NOTE — TELEPHONE ENCOUNTER
I spoke with patients wife who agreed to a telemedicine appointment via phone  She asked that we call [phone] 
no

## 2020-05-18 ENCOUNTER — TELEPHONE (OUTPATIENT)
Dept: NEPHROLOGY | Facility: CLINIC | Age: 70
End: 2020-05-18

## 2020-05-26 ENCOUNTER — TELEMEDICINE (OUTPATIENT)
Dept: NEPHROLOGY | Facility: CLINIC | Age: 70
End: 2020-05-26
Payer: MEDICARE

## 2020-05-26 VITALS — DIASTOLIC BLOOD PRESSURE: 82 MMHG | HEART RATE: 70 BPM | SYSTOLIC BLOOD PRESSURE: 130 MMHG

## 2020-05-26 DIAGNOSIS — N18.9 CHRONIC KIDNEY DISEASE, UNSPECIFIED CKD STAGE: ICD-10-CM

## 2020-05-26 DIAGNOSIS — I10 HYPERTENSION, UNSPECIFIED TYPE: Primary | ICD-10-CM

## 2020-05-26 PROBLEM — N18.30 CKD (CHRONIC KIDNEY DISEASE) STAGE 3, GFR 30-59 ML/MIN (HCC): Status: RESOLVED | Noted: 2018-03-19 | Resolved: 2020-05-26

## 2020-05-26 PROCEDURE — 99214 OFFICE O/P EST MOD 30 MIN: CPT | Performed by: INTERNAL MEDICINE

## 2020-05-26 RX ORDER — LABETALOL 200 MG/1
200 TABLET, FILM COATED ORAL 2 TIMES DAILY
Qty: 180 TABLET | Refills: 3 | Status: SHIPPED | OUTPATIENT
Start: 2020-05-26 | End: 2021-05-25 | Stop reason: SDUPTHER

## 2020-05-26 RX ORDER — AMLODIPINE BESYLATE AND BENAZEPRIL HYDROCHLORIDE 10; 20 MG/1; MG/1
1 CAPSULE ORAL DAILY
Qty: 90 CAPSULE | Refills: 3 | Status: SHIPPED | OUTPATIENT
Start: 2020-05-26 | End: 2021-05-07

## 2020-11-12 ENCOUNTER — OFFICE VISIT (OUTPATIENT)
Dept: NEPHROLOGY | Facility: CLINIC | Age: 70
End: 2020-11-12
Payer: MEDICARE

## 2020-11-12 VITALS
HEART RATE: 80 BPM | WEIGHT: 209 LBS | SYSTOLIC BLOOD PRESSURE: 142 MMHG | TEMPERATURE: 97.9 F | BODY MASS INDEX: 33.59 KG/M2 | DIASTOLIC BLOOD PRESSURE: 80 MMHG | HEIGHT: 66 IN

## 2020-11-12 DIAGNOSIS — N18.9 CHRONIC KIDNEY DISEASE, UNSPECIFIED CKD STAGE: ICD-10-CM

## 2020-11-12 DIAGNOSIS — I10 HYPERTENSION, UNSPECIFIED TYPE: Primary | ICD-10-CM

## 2020-11-12 PROCEDURE — 99214 OFFICE O/P EST MOD 30 MIN: CPT | Performed by: INTERNAL MEDICINE

## 2021-03-16 ENCOUNTER — OFFICE VISIT (OUTPATIENT)
Dept: NEPHROLOGY | Facility: CLINIC | Age: 71
End: 2021-03-16
Payer: MEDICARE

## 2021-03-16 VITALS
SYSTOLIC BLOOD PRESSURE: 148 MMHG | BODY MASS INDEX: 33.11 KG/M2 | WEIGHT: 206 LBS | DIASTOLIC BLOOD PRESSURE: 84 MMHG | HEART RATE: 80 BPM | HEIGHT: 66 IN

## 2021-03-16 DIAGNOSIS — N18.9 CHRONIC KIDNEY DISEASE, UNSPECIFIED CKD STAGE: Primary | ICD-10-CM

## 2021-03-16 DIAGNOSIS — I10 HYPERTENSION, UNSPECIFIED TYPE: ICD-10-CM

## 2021-03-16 PROCEDURE — 99214 OFFICE O/P EST MOD 30 MIN: CPT | Performed by: INTERNAL MEDICINE

## 2021-03-16 NOTE — PATIENT INSTRUCTIONS
You are here for follow-up you reviewed the interim history and I am sorry you had so much happen to you and your family  You did have COVID-19 infection and have recovered from that  With respect to her kidney function her latest creatinine was 2 8  When we last saw you it had gone over 3 and then we repeated was 2 6 and then 2 weight most recently  To me those levels have improved back to your prior baseline in the 3 1 was likely just an aberrancy that day  Blood pressure is stable and controlled on her current medications  For now things look stable I would just continue current medications  You did inquire if it was okay to take tramadol and the prostate supplement and both of those are okay and have no ill effect on your kidney function      Labs and follow-up as scheduled

## 2021-03-16 NOTE — LETTER
March 16, 2021     Dee Spence, [address] [id number]    Patient: Skinny Royal   YOB: 1950   Date of Visit: 3/16/2021       Dear Dr Diana Burris: Thank you for referring Sveta Murray to me for evaluation  Below are my notes for this consultation  If you have questions, please do not hesitate to call me  I look forward to following your patient along with you  Sincerely,        Matty Reynolds MD        CC: No Recipients  Matty Reynolds MD  3/16/2021  5:00 PM  Sign when Signing Visit  NEPHROLOGY PROGRESS NOTE    Skinny Royal 78 y o  male MRN: [id number]  Unit/Bed#:  Encounter: [id number]  Reason for Consult:  Chronic kidney disease and hypertension    The patient is here for routine follow-up  Unfortunately he did have COVID-19 infection although he was not hospitalized he states he was very ill and require prednisone at home  He has now recovered from that  We reviewed his medications and otherwise no complaints today  Does have chronic back pain  ASSESSMENT/PLAN:  1  Renal    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  I have been following this patient for many years and creatinine is been relatively stable overall these years without significant progression  He has always had low levels of proteinuria and earlier in our relationship he had very difficult to control hypertension that was very elevated  Last creatinine was 3 1 but it has declined back to 2 6 and then repeated this time was 2 8 so he is back to his baseline  Continue current medications and follow-up as scheduled  2  Hypertension    Patient's blood pressure has been acceptable on his current medical regiment  It was very difficult to find a regimen that helped but have to admit when he had back surgery with some reduction in the pain he was having his blood pressure improved somewhat  Continue to follow      SUBJECTIVE:  Review of Systems Constitution: Negative for chills, decreased appetite, fever and malaise/fatigue  HENT: Negative  Eyes: Negative  Cardiovascular: Negative  Negative for chest pain, dyspnea on exertion, leg swelling and orthopnea  Respiratory: Negative  Negative for cough, shortness of breath, sputum production and wheezing  Musculoskeletal:        Chronic back pain   Gastrointestinal: Negative  Negative for bloating, abdominal pain, diarrhea, nausea and vomiting  Genitourinary: Negative for dysuria, flank pain, hematuria and incomplete emptying  Neurological: Negative for dizziness, focal weakness, headaches and weakness  Psychiatric/Behavioral: Negative for altered mental status, depression, hallucinations and hypervigilance  OBJECTIVE:  Current Weight: Weight - Scale: 93 4 kg (206 lb)  [email or web address]:     Blood pressure 148/84, pulse 80, height 5' 6" (1 676 m), weight 93 4 kg (206 lb)  , Body mass index is 33 25 kg/m²  [unfilled]    Physical Exam: /84 (BP Location: Right arm, Patient Position: Sitting, Cuff Size: Standard)   Pulse 80   Ht 5' 6" (1 676 m)   Wt 93 4 kg (206 lb)   BMI 33 25 kg/m²   Physical Exam  Constitutional:       General: He is not in acute distress  Appearance: Normal appearance  He is not ill-appearing or diaphoretic  HENT:      Head: Normocephalic and atraumatic  Nose:      Comments: Mask     Mouth/Throat:      Comments: Mask  Eyes:      General: No scleral icterus  Extraocular Movements: Extraocular movements intact  Neck:      Musculoskeletal: Normal range of motion and neck supple  Cardiovascular:      Rate and Rhythm: Normal rate and regular rhythm  Heart sounds: No friction rub  No gallop  Comments: No edema  Pulmonary:      Effort: Pulmonary effort is normal  No respiratory distress  Breath sounds: Normal breath sounds  No wheezing, rhonchi or rales     Abdominal:      General: Bowel sounds are normal  There is no distension  Palpations: Abdomen is soft  Tenderness: There is no abdominal tenderness  There is no rebound  Neurological:      General: No focal deficit present  Mental Status: He is alert and oriented to person, place, and time  Mental status is at baseline  Psychiatric:         Mood and Affect: Mood normal          Behavior: Behavior normal          Thought Content:  Thought content normal          Judgment: Judgment normal          Medications:    Current Outpatient Medications:     ALPRAZolam (XANAX) 0 25 mg tablet, Take 1 tablet by mouth daily as needed, Disp: , Rfl:     amLODIPine-benazepril (LOTREL) 10-20 MG per capsule, Take 1 capsule by mouth daily, Disp: 90 capsule, Rfl: 3    Ascorbic Acid (VITAMIN C) 1000 MG tablet, Take 1 tablet by mouth daily, Disp: , Rfl:     aspirin 81 MG tablet, Take 1 tablet by mouth daily, Disp: , Rfl:     atorvastatin (LIPITOR) 10 mg tablet, Take 1 tablet by mouth Once a month, Disp: , Rfl:     cyclobenzaprine (FLEXERIL) 10 mg tablet, Take 10 mg by mouth 3 (three) times a day as needed for muscle spasms, Disp: , Rfl:     fexofenadine (ALLEGRA) 60 MG tablet, Take by mouth, Disp: , Rfl:     fluticasone (FLONASE) 50 mcg/act nasal spray, 1 spray into each nostril daily, Disp: , Rfl:     fluticasone-salmeterol (ADVAIR, WIXELA) 250-50 mcg/dose inhaler, Inhale 1 puff 2 (two) times a day, Disp: , Rfl:     gabapentin (NEURONTIN) 100 mg capsule, Take 100 mg by mouth as needed, Disp: , Rfl:     labetalol (NORMODYNE) 200 mg tablet, Take 1 tablet (200 mg total) by mouth 2 (two) times a day, Disp: 180 tablet, Rfl: 3    Multiple Vitamin (MULTIVITAMIN) capsule, Take 1 capsule by mouth daily, Disp: , Rfl:     traMADol (ULTRAM) 50 mg tablet, Take 50 mg by mouth as needed for moderate pain, Disp: , Rfl:     fluticasone-salmeterol (ADVAIR DISKUS) 250-50 mcg/dose inhaler, Inhale 1 puff every 12 (twelve) hours, Disp: , Rfl:     traMADol-acetaminophen (ULTRACET) 37 5-325 mg per tablet, Take 1 tablet by mouth every 6 (six) hours as needed, Disp: , Rfl: 0    Laboratory Results:  No results found for: WBC, HGB, HCT, MCV, PLT  Lab Results   Component Value Date    SODIUM 140 03/09/2018    K 4 4 03/09/2018     03/09/2018    CO2 21 03/09/2018    BUN 28 (A) 03/09/2018    CREATININE 2 54 (A) 03/09/2018    GLUC 105 03/09/2018    CALCIUM 9 1 03/09/2018     Lab Results   Component Value Date    CALCIUM 9 1 03/09/2018     No results found for: LABPROT

## 2021-03-16 NOTE — PROGRESS NOTES
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  I have been following this patient for many years and creatinine is been relatively stable overall these years without significant progression  He has always had low levels of proteinuria and earlier in our relationship he had very difficult to control hypertension that was very elevated  Last creatinine was 3 1 but it has declined back to 2 6 and then repeated this time was 2 8 so he is back to his baseline  Continue current medications and follow-up as scheduled  2  Hypertension    Patient's blood pressure has been acceptable on his current medical regiment  It was very difficult to find a regimen that helped but have to admit when he had back surgery with some reduction in the pain he was having his blood pressure improved somewhat  Continue to follow  SUBJECTIVE:  Review of Systems   Constitution: Negative for chills, decreased appetite, fever and malaise/fatigue  HENT: Negative  Eyes: Negative  Cardiovascular: Negative  Negative for chest pain, dyspnea on exertion, leg swelling and orthopnea  Respiratory: Negative  Negative for cough, shortness of breath, sputum production and wheezing  Musculoskeletal:        Chronic back pain   Gastrointestinal: Negative  Negative for bloating, abdominal pain, diarrhea, nausea and vomiting  Genitourinary: Negative for dysuria, flank pain, hematuria and incomplete emptying  Neurological: Negative for dizziness, focal weakness, headaches and weakness  Psychiatric/Behavioral: Negative for altered mental status, depression, hallucinations and hypervigilance  OBJECTIVE:  Current Weight: Weight - Scale: 93 4 kg (206 lb)  [email or web address]:     Blood pressure 148/84, pulse 80, height 5' 6" (1 676 m), weight 93 4 kg (206 lb)  , Body mass index is 33 25 kg/m²  [unfilled]    Physical Exam: /84 (BP Location: Right arm, Patient Position: Sitting, Cuff Size: Standard)   Pulse 80   Ht 5' 6" (1 676 m)   Wt 93 4 kg (206 lb)   BMI 33 25 kg/m²   Physical Exam  Constitutional:       General: He is not in acute distress  Appearance: Normal appearance  He is not ill-appearing or diaphoretic  HENT:      Head: Normocephalic and atraumatic  Nose:      Comments: Mask     Mouth/Throat:      Comments: Mask  Eyes:      General: No scleral icterus  Extraocular Movements: Extraocular movements intact  Neck:      Musculoskeletal: Normal range of motion and neck supple  Cardiovascular:      Rate and Rhythm: Normal rate and regular rhythm  Heart sounds: No friction rub  No gallop  Comments: No edema  Pulmonary:      Effort: Pulmonary effort is normal  No respiratory distress  Breath sounds: Normal breath sounds  No wheezing, rhonchi or rales  Abdominal:      General: Bowel sounds are normal  There is no distension  Palpations: Abdomen is soft  Tenderness: There is no abdominal tenderness  There is no rebound  Neurological:      General: No focal deficit present  Mental Status: He is alert and oriented to person, place, and time  Mental status is at baseline  Psychiatric:         Mood and Affect: Mood normal          Behavior: Behavior normal          Thought Content:  Thought content normal          Judgment: Judgment normal          Medications:    Current Outpatient Medications:     ALPRAZolam (XANAX) 0 25 mg tablet, Take 1 tablet by mouth daily as needed, Disp: , Rfl:     amLODIPine-benazepril (LOTREL) 10-20 MG per capsule, Take 1 capsule by mouth daily, Disp: 90 capsule, Rfl: 3    Ascorbic Acid (VITAMIN C) 1000 MG tablet, Take 1 tablet by mouth daily, Disp: , Rfl:     aspirin 81 MG tablet, Take 1 tablet by mouth daily, Disp: , Rfl:     atorvastatin (LIPITOR) 10 mg tablet, Take 1 tablet by mouth Once a month, Disp: , Rfl:     cyclobenzaprine (FLEXERIL) 10 mg tablet, Take 10 mg by mouth 3 (three) times a day as needed for muscle spasms, Disp: , Rfl:     fexofenadine (ALLEGRA) 60 MG tablet, Take by mouth, Disp: , Rfl:     fluticasone (FLONASE) 50 mcg/act nasal spray, 1 spray into each nostril daily, Disp: , Rfl:     fluticasone-salmeterol (ADVAIR, WIXELA) 250-50 mcg/dose inhaler, Inhale 1 puff 2 (two) times a day, Disp: , Rfl:     gabapentin (NEURONTIN) 100 mg capsule, Take 100 mg by mouth as needed, Disp: , Rfl:     labetalol (NORMODYNE) 200 mg tablet, Take 1 tablet (200 mg total) by mouth 2 (two) times a day, Disp: 180 tablet, Rfl: 3    Multiple Vitamin (MULTIVITAMIN) capsule, Take 1 capsule by mouth daily, Disp: , Rfl:     traMADol (ULTRAM) 50 mg tablet, Take 50 mg by mouth as needed for moderate pain, Disp: , Rfl:     fluticasone-salmeterol (ADVAIR DISKUS) 250-50 mcg/dose inhaler, Inhale 1 puff every 12 (twelve) hours, Disp: , Rfl:     traMADol-acetaminophen (ULTRACET) 37 5-325 mg per tablet, Take 1 tablet by mouth every 6 (six) hours as needed, Disp: , Rfl: 0    Laboratory Results:  No results found for: WBC, HGB, HCT, MCV, PLT  Lab Results   Component Value Date    SODIUM 140 03/09/2018    K 4 4 03/09/2018     03/09/2018    CO2 21 03/09/2018    BUN 28 (A) 03/09/2018    CREATININE 2 54 (A) 03/09/2018    GLUC 105 03/09/2018    CALCIUM 9 1 03/09/2018     Lab Results   Component Value Date    CALCIUM 9 1 03/09/2018     No results found for: LABPROT

## 2021-03-30 DIAGNOSIS — Z23 ENCOUNTER FOR IMMUNIZATION: ICD-10-CM

## 2021-05-07 DIAGNOSIS — I10 HYPERTENSION, UNSPECIFIED TYPE: ICD-10-CM

## 2021-05-07 RX ORDER — AMLODIPINE BESYLATE AND BENAZEPRIL HYDROCHLORIDE 10; 20 MG/1; MG/1
CAPSULE ORAL
Qty: 90 CAPSULE | Refills: 3 | Status: SHIPPED | OUTPATIENT
Start: 2021-05-07 | End: 2022-05-14

## 2021-05-25 DIAGNOSIS — I10 HYPERTENSION, UNSPECIFIED TYPE: ICD-10-CM

## 2021-05-25 RX ORDER — LABETALOL 200 MG/1
200 TABLET, FILM COATED ORAL 2 TIMES DAILY
Qty: 180 TABLET | Refills: 3 | Status: SHIPPED | OUTPATIENT
Start: 2021-05-25 | End: 2022-05-14

## 2021-11-22 ENCOUNTER — OFFICE VISIT (OUTPATIENT)
Dept: NEPHROLOGY | Facility: CLINIC | Age: 71
End: 2021-11-22
Payer: MEDICARE

## 2021-11-22 VITALS
DIASTOLIC BLOOD PRESSURE: 80 MMHG | SYSTOLIC BLOOD PRESSURE: 144 MMHG | HEART RATE: 80 BPM | WEIGHT: 204 LBS | BODY MASS INDEX: 32.78 KG/M2 | HEIGHT: 66 IN

## 2021-11-22 DIAGNOSIS — I10 HYPERTENSION, UNSPECIFIED TYPE: ICD-10-CM

## 2021-11-22 DIAGNOSIS — N18.9 CHRONIC KIDNEY DISEASE, UNSPECIFIED CKD STAGE: Primary | ICD-10-CM

## 2021-11-22 PROCEDURE — 99214 OFFICE O/P EST MOD 30 MIN: CPT | Performed by: INTERNAL MEDICINE

## 2022-04-05 ENCOUNTER — TELEPHONE (OUTPATIENT)
Dept: NEPHROLOGY | Facility: CLINIC | Age: 72
End: 2022-04-05

## 2022-04-05 NOTE — TELEPHONE ENCOUNTER
Appointment Confirmation   Person confirmed appointment with  If not patient, name of the person Patient    Date and time of appointment 4/6 11:30    Patient acknowledged and will be at appointment? yes    Did you advise the patient that they will need a urine sample if they are a new patient?  N/A    Did you advise the patient to bring their current medications for verification? (including any OTC) Yes    Additional Information
0

## 2022-04-06 ENCOUNTER — OFFICE VISIT (OUTPATIENT)
Dept: NEPHROLOGY | Facility: CLINIC | Age: 72
End: 2022-04-06
Payer: MEDICARE

## 2022-04-06 VITALS
WEIGHT: 206 LBS | SYSTOLIC BLOOD PRESSURE: 132 MMHG | BODY MASS INDEX: 33.11 KG/M2 | DIASTOLIC BLOOD PRESSURE: 80 MMHG | HEART RATE: 80 BPM | HEIGHT: 66 IN

## 2022-04-06 DIAGNOSIS — N18.9 CHRONIC KIDNEY DISEASE, UNSPECIFIED CKD STAGE: Primary | ICD-10-CM

## 2022-04-06 DIAGNOSIS — I10 PRIMARY HYPERTENSION: ICD-10-CM

## 2022-04-06 PROCEDURE — 99214 OFFICE O/P EST MOD 30 MIN: CPT | Performed by: INTERNAL MEDICINE

## 2022-04-06 RX ORDER — AMOXICILLIN 500 MG
CAPSULE ORAL 2 TIMES DAILY
COMMUNITY

## 2022-04-06 NOTE — PATIENT INSTRUCTIONS
You are here for follow-up sounds like things are going very well  Your creatinine level is 2 7 which is stable at her baseline and her blood pressure is excellent on her current medications  For now no changes things are stable over these many years  I am also glad to hear that your back pain is livable  Continue with your exercise as tolerated and family doctor follow-up who is dealing with your cholesterol  Labs and follow-up as scheduled  No changes in medications  Please call if you have any questions or concerns before next visit

## 2022-04-06 NOTE — LETTER
April 6, 2022     Moreno Montes, [address] [facility]  [address] [address]    Patient: Stefani An   YOB: 1950   Date of Visit: 4/6/2022       Dear Dr Tana Reynolds: Thank you for referring Isha Sparks to me for evaluation  Below are my notes for this consultation  If you have questions, please do not hesitate to call me  I look forward to following your patient along with you  Sincerely,        Arnold Navarrete MD        CC: No Recipients  Arnold Navarrete MD  4/6/2022 12:37 PM  Sign when Signing Visit  NEPHROLOGY PROGRESS NOTE    Stefani An 73 y o  male MRN: [id number]  Unit/Bed#:  Encounter: [id number]  Reason for Consult:  Chronic kidney disease    Patient is here for routine follow-up  He has been doing well states he is getting ready to go on a nice trip to [place] in the fall  Other than that he is feeling well he did have epidural injections in his back and starting to wear off  No other changes  We reviewed his medications  He did get admitted to the hospital for syncope at the gym and had low blood pressure and dehydration  No further events  ASSESSMENT/PLAN:  1  Renal    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  Creatinine is stable at 2 7 which is his baseline  The patient had very difficult and severe hypertension for many many years for me young age and now is blood pressure is under excellent control on his medications  So for now continue to monitor on current medications there has been no progression of his underlying kidney disease and will follow  2  Hypertension    He is on amlodipine-benazepril and labetalol  Blood pressure is excellent so no changes  SUBJECTIVE:  Review of Systems   Constitutional: Negative for chills, fever, malaise/fatigue and night sweats  HENT: Negative  Eyes: Negative  Cardiovascular: Negative  Negative for chest pain, dyspnea on exertion, leg swelling and orthopnea  Respiratory: Negative  Negative for cough, shortness of breath, sputum production and wheezing  Musculoskeletal: Positive for back pain  Gastrointestinal: Negative for abdominal pain, diarrhea, nausea and vomiting  Genitourinary: Negative for dysuria, flank pain, hematuria and incomplete emptying  Neurological: Negative for dizziness, focal weakness, headaches and weakness  Psychiatric/Behavioral: Negative for altered mental status, depression, hallucinations and hypervigilance  OBJECTIVE:  Current Weight: Weight - Scale: 93 4 kg (206 lb)  [email or web address]:     Blood pressure 132/80, pulse 80, height 5' 6" (1 676 m), weight 93 4 kg (206 lb)  , Body mass index is 33 25 kg/m²  [unfilled]    Physical Exam: /80 (BP Location: Left arm, Patient Position: Sitting, Cuff Size: Standard)   Pulse 80   Ht 5' 6" (1 676 m)   Wt 93 4 kg (206 lb)   BMI 33 25 kg/m²   Physical Exam  Constitutional:       General: He is not in acute distress  Appearance: He is not toxic-appearing or diaphoretic  HENT:      Head: Normocephalic and atraumatic  Mouth/Throat:      Mouth: Mucous membranes are moist    Eyes:      General: No scleral icterus  Extraocular Movements: Extraocular movements intact  Cardiovascular:      Rate and Rhythm: Normal rate and regular rhythm  Heart sounds: No friction rub  No gallop  Comments: No edema  Pulmonary:      Effort: Pulmonary effort is normal  No respiratory distress  Breath sounds: Normal breath sounds  No wheezing, rhonchi or rales  Abdominal:      General: Bowel sounds are normal  There is no distension  Palpations: Abdomen is soft  Tenderness: There is no abdominal tenderness  There is no rebound  Musculoskeletal:      Cervical back: Normal range of motion and neck supple  Neurological:      General: No focal deficit present  Mental Status: He is alert and oriented to person, place, and time   Mental status is at baseline  Psychiatric:         Mood and Affect: Mood normal          Behavior: Behavior normal          Thought Content:  Thought content normal          Judgment: Judgment normal          Medications:    Current Outpatient Medications:     ALPRAZolam (XANAX) 0 25 mg tablet, Take 1 tablet by mouth daily as needed, Disp: , Rfl:     amLODIPine-benazepril (LOTREL) 10-20 MG per capsule, TAKE 1 CAPSULE BY MOUTH EVERY DAY, Disp: 90 capsule, Rfl: 3    Ascorbic Acid (VITAMIN C) 1000 MG tablet, Take 1 tablet by mouth daily, Disp: , Rfl:     aspirin 81 MG tablet, Take 1 tablet by mouth daily, Disp: , Rfl:     cyclobenzaprine (FLEXERIL) 10 mg tablet, Take 10 mg by mouth daily as needed for muscle spasms  , Disp: , Rfl:     fexofenadine (ALLEGRA) 60 MG tablet, Take by mouth if needed  , Disp: , Rfl:     fluticasone (FLONASE) 50 mcg/act nasal spray, 1 spray into each nostril daily, Disp: , Rfl:     fluticasone-salmeterol (ADVAIR, WIXELA) 250-50 mcg/dose inhaler, Inhale 1 puff 2 (two) times a day, Disp: , Rfl:     gabapentin (NEURONTIN) 100 mg capsule, Take 100 mg by mouth 3 (three) times a day as needed  , Disp: , Rfl:     labetalol (NORMODYNE) 200 mg tablet, Take 1 tablet (200 mg total) by mouth 2 (two) times a day, Disp: 180 tablet, Rfl: 3    Multiple Vitamin (MULTIVITAMIN) capsule, Take 1 capsule by mouth daily, Disp: , Rfl:     Omega-3 Fatty Acids (Fish Oil) 1200 MG CAPS, Take by mouth 2 (two) times a day, Disp: , Rfl:     traMADol (ULTRAM) 50 mg tablet, Take 50 mg by mouth 2 (two) times a day as needed for moderate pain  , Disp: , Rfl:     atorvastatin (LIPITOR) 10 mg tablet, Take 1 tablet by mouth Once a month (Patient not taking: Reported on 11/22/2021 ), Disp: , Rfl:     fluticasone-salmeterol (ADVAIR DISKUS) 250-50 mcg/dose inhaler, Inhale 1 puff every 12 (twelve) hours (Patient not taking: Reported on 11/22/2021 ), Disp: , Rfl:     traMADol-acetaminophen (ULTRACET) 37 5-325 mg per tablet, Take 1 tablet by mouth every 6 (six) hours as needed (Patient not taking: Reported on 11/22/2021 ), Disp: , Rfl: 0    Laboratory Results:  No results found for: WBC, HGB, HCT, MCV, PLT  Lab Results   Component Value Date    SODIUM 140 03/09/2018    K 4 4 03/09/2018     03/09/2018    CO2 21 03/09/2018    BUN 28 (A) 03/09/2018    CREATININE 2 54 (A) 03/09/2018    GLUC 105 03/09/2018    CALCIUM 9 1 03/09/2018     Lab Results   Component Value Date    CALCIUM 9 1 03/09/2018     No results found for: LABPROT

## 2022-04-06 NOTE — PROGRESS NOTES
NEPHROLOGY PROGRESS NOTE    Mohsen Wolff 73 y o  male MRN: [id number]  Unit/Bed#:  Encounter: [id number]  Reason for Consult:  Chronic kidney disease    Patient is here for routine follow-up  He has been doing well states he is getting ready to go on a nice trip to [place] in the fall  Other than that he is feeling well he did have epidural injections in his back and starting to wear off  No other changes  We reviewed his medications  He did get admitted to the hospital for syncope at the gym and had low blood pressure and dehydration  No further events  ASSESSMENT/PLAN:  1  Renal    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  Creatinine is stable at 2 7 which is his baseline  The patient had very difficult and severe hypertension for many many years for me young age and now is blood pressure is under excellent control on his medications  So for now continue to monitor on current medications there has been no progression of his underlying kidney disease and will follow  2  Hypertension    He is on amlodipine-benazepril and labetalol  Blood pressure is excellent so no changes  SUBJECTIVE:  Review of Systems   Constitutional: Negative for chills, fever, malaise/fatigue and night sweats  HENT: Negative  Eyes: Negative  Cardiovascular: Negative  Negative for chest pain, dyspnea on exertion, leg swelling and orthopnea  Respiratory: Negative  Negative for cough, shortness of breath, sputum production and wheezing  Musculoskeletal: Positive for back pain  Gastrointestinal: Negative for abdominal pain, diarrhea, nausea and vomiting  Genitourinary: Negative for dysuria, flank pain, hematuria and incomplete emptying  Neurological: Negative for dizziness, focal weakness, headaches and weakness  Psychiatric/Behavioral: Negative for altered mental status, depression, hallucinations and hypervigilance         OBJECTIVE:  Current Weight: Weight - Scale: 93 4 kg (206 lb)  [email or web address]:     Blood pressure 132/80, pulse 80, height 5' 6" (1 676 m), weight 93 4 kg (206 lb)  , Body mass index is 33 25 kg/m²  [unfilled]    Physical Exam: /80 (BP Location: Left arm, Patient Position: Sitting, Cuff Size: Standard)   Pulse 80   Ht 5' 6" (1 676 m)   Wt 93 4 kg (206 lb)   BMI 33 25 kg/m²   Physical Exam  Constitutional:       General: He is not in acute distress  Appearance: He is not toxic-appearing or diaphoretic  HENT:      Head: Normocephalic and atraumatic  Mouth/Throat:      Mouth: Mucous membranes are moist    Eyes:      General: No scleral icterus  Extraocular Movements: Extraocular movements intact  Cardiovascular:      Rate and Rhythm: Normal rate and regular rhythm  Heart sounds: No friction rub  No gallop  Comments: No edema  Pulmonary:      Effort: Pulmonary effort is normal  No respiratory distress  Breath sounds: Normal breath sounds  No wheezing, rhonchi or rales  Abdominal:      General: Bowel sounds are normal  There is no distension  Palpations: Abdomen is soft  Tenderness: There is no abdominal tenderness  There is no rebound  Musculoskeletal:      Cervical back: Normal range of motion and neck supple  Neurological:      General: No focal deficit present  Mental Status: He is alert and oriented to person, place, and time  Mental status is at baseline  Psychiatric:         Mood and Affect: Mood normal          Behavior: Behavior normal          Thought Content:  Thought content normal          Judgment: Judgment normal          Medications:    Current Outpatient Medications:     ALPRAZolam (XANAX) 0 25 mg tablet, Take 1 tablet by mouth daily as needed, Disp: , Rfl:     amLODIPine-benazepril (LOTREL) 10-20 MG per capsule, TAKE 1 CAPSULE BY MOUTH EVERY DAY, Disp: 90 capsule, Rfl: 3    Ascorbic Acid (VITAMIN C) 1000 MG tablet, Take 1 tablet by mouth daily, Disp: , Rfl:     aspirin 81 MG tablet, Take 1 tablet by mouth daily, Disp: , Rfl:     cyclobenzaprine (FLEXERIL) 10 mg tablet, Take 10 mg by mouth daily as needed for muscle spasms  , Disp: , Rfl:     fexofenadine (ALLEGRA) 60 MG tablet, Take by mouth if needed  , Disp: , Rfl:     fluticasone (FLONASE) 50 mcg/act nasal spray, 1 spray into each nostril daily, Disp: , Rfl:     fluticasone-salmeterol (ADVAIR, WIXELA) 250-50 mcg/dose inhaler, Inhale 1 puff 2 (two) times a day, Disp: , Rfl:     gabapentin (NEURONTIN) 100 mg capsule, Take 100 mg by mouth 3 (three) times a day as needed  , Disp: , Rfl:     labetalol (NORMODYNE) 200 mg tablet, Take 1 tablet (200 mg total) by mouth 2 (two) times a day, Disp: 180 tablet, Rfl: 3    Multiple Vitamin (MULTIVITAMIN) capsule, Take 1 capsule by mouth daily, Disp: , Rfl:     Omega-3 Fatty Acids (Fish Oil) 1200 MG CAPS, Take by mouth 2 (two) times a day, Disp: , Rfl:     traMADol (ULTRAM) 50 mg tablet, Take 50 mg by mouth 2 (two) times a day as needed for moderate pain  , Disp: , Rfl:     atorvastatin (LIPITOR) 10 mg tablet, Take 1 tablet by mouth Once a month (Patient not taking: Reported on 11/22/2021 ), Disp: , Rfl:     fluticasone-salmeterol (ADVAIR DISKUS) 250-50 mcg/dose inhaler, Inhale 1 puff every 12 (twelve) hours (Patient not taking: Reported on 11/22/2021 ), Disp: , Rfl:     traMADol-acetaminophen (ULTRACET) 37 5-325 mg per tablet, Take 1 tablet by mouth every 6 (six) hours as needed (Patient not taking: Reported on 11/22/2021 ), Disp: , Rfl: 0    Laboratory Results:  No results found for: WBC, HGB, HCT, MCV, PLT  Lab Results   Component Value Date    SODIUM 140 03/09/2018    K 4 4 03/09/2018     03/09/2018    CO2 21 03/09/2018    BUN 28 (A) 03/09/2018    CREATININE 2 54 (A) 03/09/2018    GLUC 105 03/09/2018    CALCIUM 9 1 03/09/2018     Lab Results   Component Value Date    CALCIUM 9 1 03/09/2018     No results found for: LABPROT

## 2022-05-14 DIAGNOSIS — I10 HYPERTENSION, UNSPECIFIED TYPE: ICD-10-CM

## 2022-05-14 RX ORDER — AMLODIPINE BESYLATE AND BENAZEPRIL HYDROCHLORIDE 10; 20 MG/1; MG/1
CAPSULE ORAL
Qty: 90 CAPSULE | Refills: 3 | Status: SHIPPED | OUTPATIENT
Start: 2022-05-14

## 2022-05-14 RX ORDER — LABETALOL 200 MG/1
TABLET, FILM COATED ORAL
Qty: 180 TABLET | Refills: 3 | Status: SHIPPED | OUTPATIENT
Start: 2022-05-14

## 2022-09-28 ENCOUNTER — OFFICE VISIT (OUTPATIENT)
Dept: NEPHROLOGY | Facility: CLINIC | Age: 72
End: 2022-09-28
Payer: MEDICARE

## 2022-09-28 VITALS
DIASTOLIC BLOOD PRESSURE: 80 MMHG | SYSTOLIC BLOOD PRESSURE: 144 MMHG | HEIGHT: 66 IN | HEART RATE: 88 BPM | BODY MASS INDEX: 33.25 KG/M2

## 2022-09-28 DIAGNOSIS — I10 HYPERTENSION, UNSPECIFIED TYPE: ICD-10-CM

## 2022-09-28 DIAGNOSIS — N18.9 CHRONIC KIDNEY DISEASE, UNSPECIFIED CKD STAGE: Primary | ICD-10-CM

## 2022-09-28 PROCEDURE — 99214 OFFICE O/P EST MOD 30 MIN: CPT | Performed by: INTERNAL MEDICINE

## 2022-09-28 NOTE — PROGRESS NOTES
NEPHROLOGY PROGRESS NOTE    Kumar Hawk 73 y o  male MRN: [id number]  Unit/Bed#:  Encounter: [id number]  Reason for Consult:  Chronic kidney disease and hypertension    Patient is here for routine follow-up  He is having a lot of his chronic lower back pain he states he is due for epidural injections  He also recently had a trip to [facility] and would some baseball on his vacation  He is also starting to exercise again and states his blood pressures been decent  ASSESSMENT/PLAN:  1  Renal    Patient has chronic kidney disease and creatinine was in the 2s when I met him many years ago 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 and possibly with result take a lot of anti-inflammatories as he had severe back pain in the past and was taking them for years  His creatinine is 2 7 which is stable without progression in a couple years so he is doing well  Never had a significant amount of proteinuria so I explained him the way to help manage this is to help control with blood pressure treatment  Overall he stable electrolytes are normal continue current medications and follow-up as scheduled  2  Hypertension    Patient's history of very high blood pressure when I met him he is now on amlodipine-benazepril and labetalol his blood pressures are under good control he is tolerating these medications  Continue with no changes  He was told to call if there is any problems or concerns before next visit  SUBJECTIVE:  Review of Systems   Constitutional: Negative for chills, fever, malaise/fatigue and night sweats  HENT: Negative  Eyes: Negative  Cardiovascular: Negative for chest pain, dyspnea on exertion, leg swelling and orthopnea  Respiratory: Negative  Negative for cough, shortness of breath, sputum production and wheezing  Gastrointestinal: Negative for abdominal pain, diarrhea, nausea and vomiting  Genitourinary: Negative for dysuria, flank pain, hematuria and incomplete emptying  Neurological: Negative for dizziness, focal weakness, headaches and weakness  Psychiatric/Behavioral: Negative for altered mental status, depression, hallucinations and hypervigilance  OBJECTIVE:  Current Weight:    [email or web address] com:     Blood pressure 144/80, pulse 88, height 5' 6" (1 676 m)  , Body mass index is 33 25 kg/m²  [unfilled]    Physical Exam: /80 (BP Location: Right arm, Patient Position: Sitting, Cuff Size: Standard)   Pulse 88   Ht 5' 6" (1 676 m)   BMI 33 25 kg/m²   Physical Exam  Constitutional:       General: He is not in acute distress  Appearance: He is not toxic-appearing or diaphoretic  HENT:      Head: Normocephalic and atraumatic  Nose:      Comments: Wearing mask  Mouth/Throat:      Comments: Wearing mask  Eyes:      General: No scleral icterus  Extraocular Movements: Extraocular movements intact  Cardiovascular:      Rate and Rhythm: Normal rate and regular rhythm  Heart sounds: No friction rub  No gallop  Comments: Trace edema  Pulmonary:      Effort: Pulmonary effort is normal  No respiratory distress  Breath sounds: No wheezing, rhonchi or rales  Abdominal:      General: Bowel sounds are normal  There is no distension  Palpations: Abdomen is soft  Tenderness: There is no abdominal tenderness  There is no rebound  Musculoskeletal:      Cervical back: Normal range of motion and neck supple  Neurological:      General: No focal deficit present  Mental Status: He is alert and oriented to person, place, and time  Mental status is at baseline  Psychiatric:         Mood and Affect: Mood normal          Behavior: Behavior normal          Thought Content:  Thought content normal          Judgment: Judgment normal          Medications:    Current Outpatient Medications:     ALPRAZolam (XANAX) 0 25 mg tablet, Take 1 tablet by mouth daily as needed, Disp: , Rfl:     amLODIPine-benazepril (LOTREL) 10-20 MG per capsule, TAKE 1 CAPSULE BY MOUTH EVERY DAY, Disp: 90 capsule, Rfl: 3    Ascorbic Acid (VITAMIN C) 1000 MG tablet, Take 1 tablet by mouth daily, Disp: , Rfl:     aspirin 81 MG tablet, Take 1 tablet by mouth daily, Disp: , Rfl:     cyclobenzaprine (FLEXERIL) 10 mg tablet, Take 10 mg by mouth daily as needed for muscle spasms  , Disp: , Rfl:     fexofenadine (ALLEGRA) 60 MG tablet, Take by mouth if needed  , Disp: , Rfl:     fluticasone (FLONASE) 50 mcg/act nasal spray, 1 spray into each nostril daily, Disp: , Rfl:     fluticasone-salmeterol (ADVAIR DISKUS) 250-50 mcg/dose inhaler, Inhale 1 puff every 12 (twelve) hours, Disp: , Rfl:     fluticasone-salmeterol (ADVAIR, WIXELA) 250-50 mcg/dose inhaler, Inhale 1 puff 2 (two) times a day, Disp: , Rfl:     gabapentin (NEURONTIN) 100 mg capsule, Take 100 mg by mouth 3 (three) times a day as needed  , Disp: , Rfl:     labetalol (NORMODYNE) 200 mg tablet, TAKE 1 TABLET BY MOUTH TWICE A DAY, Disp: 180 tablet, Rfl: 3    Multiple Vitamin (MULTIVITAMIN) capsule, Take 1 capsule by mouth daily, Disp: , Rfl:     Omega-3 Fatty Acids (Fish Oil) 1200 MG CAPS, Take by mouth 2 (two) times a day, Disp: , Rfl:     traMADol (ULTRAM) 50 mg tablet, Take 50 mg by mouth 2 (two) times a day as needed for moderate pain  , Disp: , Rfl:     traMADol-acetaminophen (ULTRACET) 37 5-325 mg per tablet, Take 1 tablet by mouth every 6 (six) hours as needed, Disp: , Rfl: 0    atorvastatin (LIPITOR) 10 mg tablet, Take 1 tablet by mouth Once a month (Patient not taking: Reported on 11/22/2021 ), Disp: , Rfl:     Laboratory Results:  No results found for: WBC, HGB, HCT, MCV, PLT  Lab Results   Component Value Date    SODIUM 140 03/09/2018    K 4 4 03/09/2018     03/09/2018    CO2 21 03/09/2018    BUN 28 (A) 03/09/2018    CREATININE 2 54 (A) 03/09/2018    GLUC 105 03/09/2018    CALCIUM 9 1 03/09/2018     Lab Results   Component Value Date    CALCIUM 9 1 03/09/2018     No results found for:  LABPROT

## 2022-09-28 NOTE — LETTER
September 28, 2022     Cynthia Mcclelland, [address] [facility]  [address] [address]    Patient: Janelle Gutierrez   YOB: 1950   Date of Visit: 9/28/2022       Dear Dr Otto Mallory: Thank you for referring Yael Guardado to me for evaluation  Below are my notes for this consultation  If you have questions, please do not hesitate to call me  I look forward to following your patient along with you  Sincerely,        Marcellus Balderas MD        CC: No Recipients  Marcellus Balderas MD  9/28/2022 12:34 PM  Sign when Signing Visit  NEPHROLOGY PROGRESS NOTE    Janelle Gutierrez 73 y o  male MRN: [id number]  Unit/Bed#:  Encounter: [id number]  Reason for Consult:  Chronic kidney disease and hypertension    Patient is here for routine follow-up  He is having a lot of his chronic lower back pain he states he is due for epidural injections  He also recently had a trip to [facility] and would some baseball on his vacation  He is also starting to exercise again and states his blood pressures been decent  ASSESSMENT/PLAN:  1  Renal    Patient has chronic kidney disease and creatinine was in the 2s when I met him many years ago 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 and possibly with result take a lot of anti-inflammatories as he had severe back pain in the past and was taking them for years  His creatinine is 2 7 which is stable without progression in a couple years so he is doing well  Never had a significant amount of proteinuria so I explained him the way to help manage this is to help control with blood pressure treatment  Overall he stable electrolytes are normal continue current medications and follow-up as scheduled  2  Hypertension    Patient's history of very high blood pressure when I met him he is now on amlodipine-benazepril and labetalol his blood pressures are under good control he is tolerating these medications  Continue with no changes      He was told to call if there is any problems or concerns before next visit  SUBJECTIVE:  Review of Systems   Constitutional: Negative for chills, fever, malaise/fatigue and night sweats  HENT: Negative  Eyes: Negative  Cardiovascular: Negative for chest pain, dyspnea on exertion, leg swelling and orthopnea  Respiratory: Negative  Negative for cough, shortness of breath, sputum production and wheezing  Gastrointestinal: Negative for abdominal pain, diarrhea, nausea and vomiting  Genitourinary: Negative for dysuria, flank pain, hematuria and incomplete emptying  Neurological: Negative for dizziness, focal weakness, headaches and weakness  Psychiatric/Behavioral: Negative for altered mental status, depression, hallucinations and hypervigilance  OBJECTIVE:  Current Weight:    [email or web address] com:     Blood pressure 144/80, pulse 88, height 5' 6" (1 676 m)  , Body mass index is 33 25 kg/m²  @[email or web address]    Physical Exam: /80 (BP Location: Right arm, Patient Position: Sitting, Cuff Size: Standard)   Pulse 88   Ht 5' 6" (1 676 m)   BMI 33 25 kg/m²   Physical Exam  Constitutional:       General: He is not in acute distress  Appearance: He is not toxic-appearing or diaphoretic  HENT:      Head: Normocephalic and atraumatic  Nose:      Comments: Wearing mask  Mouth/Throat:      Comments: Wearing mask  Eyes:      General: No scleral icterus  Extraocular Movements: Extraocular movements intact  Cardiovascular:      Rate and Rhythm: Normal rate and regular rhythm  Heart sounds: No friction rub  No gallop  Comments: Trace edema  Pulmonary:      Effort: Pulmonary effort is normal  No respiratory distress  Breath sounds: No wheezing, rhonchi or rales  Abdominal:      General: Bowel sounds are normal  There is no distension  Palpations: Abdomen is soft  Tenderness: There is no abdominal tenderness  There is no rebound     Musculoskeletal:      Cervical back: Normal range of motion and neck supple  Neurological:      General: No focal deficit present  Mental Status: He is alert and oriented to person, place, and time  Mental status is at baseline  Psychiatric:         Mood and Affect: Mood normal          Behavior: Behavior normal          Thought Content:  Thought content normal          Judgment: Judgment normal          Medications:    Current Outpatient Medications:     ALPRAZolam (XANAX) 0 25 mg tablet, Take 1 tablet by mouth daily as needed, Disp: , Rfl:     amLODIPine-benazepril (LOTREL) 10-20 MG per capsule, TAKE 1 CAPSULE BY MOUTH EVERY DAY, Disp: 90 capsule, Rfl: 3    Ascorbic Acid (VITAMIN C) 1000 MG tablet, Take 1 tablet by mouth daily, Disp: , Rfl:     aspirin 81 MG tablet, Take 1 tablet by mouth daily, Disp: , Rfl:     cyclobenzaprine (FLEXERIL) 10 mg tablet, Take 10 mg by mouth daily as needed for muscle spasms  , Disp: , Rfl:     fexofenadine (ALLEGRA) 60 MG tablet, Take by mouth if needed  , Disp: , Rfl:     fluticasone (FLONASE) 50 mcg/act nasal spray, 1 spray into each nostril daily, Disp: , Rfl:     fluticasone-salmeterol (ADVAIR DISKUS) 250-50 mcg/dose inhaler, Inhale 1 puff every 12 (twelve) hours, Disp: , Rfl:     fluticasone-salmeterol (ADVAIR, WIXELA) 250-50 mcg/dose inhaler, Inhale 1 puff 2 (two) times a day, Disp: , Rfl:     gabapentin (NEURONTIN) 100 mg capsule, Take 100 mg by mouth 3 (three) times a day as needed  , Disp: , Rfl:     labetalol (NORMODYNE) 200 mg tablet, TAKE 1 TABLET BY MOUTH TWICE A DAY, Disp: 180 tablet, Rfl: 3    Multiple Vitamin (MULTIVITAMIN) capsule, Take 1 capsule by mouth daily, Disp: , Rfl:     Omega-3 Fatty Acids (Fish Oil) 1200 MG CAPS, Take by mouth 2 (two) times a day, Disp: , Rfl:     traMADol (ULTRAM) 50 mg tablet, Take 50 mg by mouth 2 (two) times a day as needed for moderate pain  , Disp: , Rfl:     traMADol-acetaminophen (ULTRACET) 37 5-325 mg per tablet, Take 1 tablet by mouth every 6 (six) hours as needed, Disp: , Rfl: 0    atorvastatin (LIPITOR) 10 mg tablet, Take 1 tablet by mouth Once a month (Patient not taking: Reported on 11/22/2021 ), Disp: , Rfl:     Laboratory Results:  No results found for: WBC, HGB, HCT, MCV, PLT  Lab Results   Component Value Date    SODIUM 140 03/09/2018    K 4 4 03/09/2018     03/09/2018    CO2 21 03/09/2018    BUN 28 (A) 03/09/2018    CREATININE 2 54 (A) 03/09/2018    GLUC 105 03/09/2018    CALCIUM 9 1 03/09/2018     Lab Results   Component Value Date    CALCIUM 9 1 03/09/2018     No results found for: LABPROT

## 2023-02-27 ENCOUNTER — TELEPHONE (OUTPATIENT)
Dept: NEPHROLOGY | Facility: CLINIC | Age: 73
End: 2023-02-27

## 2023-03-06 ENCOUNTER — OFFICE VISIT (OUTPATIENT)
Dept: NEPHROLOGY | Facility: CLINIC | Age: 73
End: 2023-03-06

## 2023-03-06 VITALS
DIASTOLIC BLOOD PRESSURE: 80 MMHG | HEIGHT: 66 IN | SYSTOLIC BLOOD PRESSURE: 138 MMHG | HEART RATE: 80 BPM | BODY MASS INDEX: 32.95 KG/M2 | WEIGHT: 205 LBS

## 2023-03-06 DIAGNOSIS — I10 HYPERTENSION, UNSPECIFIED TYPE: ICD-10-CM

## 2023-03-06 DIAGNOSIS — N18.9 CHRONIC KIDNEY DISEASE, UNSPECIFIED CKD STAGE: Primary | ICD-10-CM

## 2023-03-06 PROBLEM — I12.0 BENIGN HYPERTENSIVE KIDNEY DISEASE WITH CHRONIC KIDNEY DISEASE STAGE V OR END STAGE RENAL DISEASE (HCC): Status: ACTIVE | Noted: 2023-03-06

## 2023-03-06 PROBLEM — I12.0 BENIGN HYPERTENSIVE KIDNEY DISEASE WITH CHRONIC KIDNEY DISEASE STAGE V OR END STAGE RENAL DISEASE (HCC): Status: RESOLVED | Noted: 2023-03-06 | Resolved: 2023-03-06

## 2023-03-06 RX ORDER — AMLODIPINE BESYLATE AND BENAZEPRIL HYDROCHLORIDE 10; 20 MG/1; MG/1
1 CAPSULE ORAL DAILY
Qty: 90 CAPSULE | Refills: 3 | Status: SHIPPED | OUTPATIENT
Start: 2023-03-06

## 2023-03-06 RX ORDER — LABETALOL 200 MG/1
200 TABLET, FILM COATED ORAL 2 TIMES DAILY
Qty: 180 TABLET | Refills: 3 | Status: SHIPPED | OUTPATIENT
Start: 2023-03-06

## 2023-03-06 NOTE — PATIENT INSTRUCTIONS
You are here for follow-up sorry to hear that your back is still bothering you but you are under the care of specialist to deal with that  The medicines you are taking are safe for your kidneys  With respect to your kidney function the creatinine is 2 9 which is slightly higher than last visit but you state you went directly after the gym so if you are a little dehydrated it could make the numbers little higher but really over the last years its not progressed much  Blood pressures under really good control on her current medications no changes    Labs and follow-up as scheduled and please call me if you have any questions or concerns before next visit

## 2023-03-06 NOTE — PROGRESS NOTES
NEPHROLOGY PROGRESS NOTE    Avis Devries 81 y o  male MRN: [id number]  Unit/Bed#:  Encounter: [id number]  Reason for Consult: Chronic kidney disease and hypertension    The patient is here for routine follow-up to assess his chronic low back pain he had a couple epidural injections and had a little bit of relief but not as much as he had hoped  Otherwise no complaints  ASSESSMENT/PLAN:  1  Renal    The patient has 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  His latest creatinine is 2 9 which is slightly higher than his baseline but he states he had blood work done directly after he was at the gym so he likely was a little volume depleted  Overall his creatinine appears stable he has had very low levels of proteinuria in the past so he likely has hypertensive nephrosclerosis especially given severely elevated blood pressure for many many years until came under better control  Continue current medications  Continue with blood pressure control  Avoids NSAIDs    2  Hypertension    Patient had difficulty control hypertension but is now been well controlled on medications including labetalol and  amlodipine benazepril  No changes continue to follow  SUBJECTIVE:  Review of Systems   Constitutional: Negative for chills, diaphoresis, fever and malaise/fatigue  HENT: Negative  Eyes: Negative  Cardiovascular: Negative  Negative for chest pain, dyspnea on exertion, orthopnea and palpitations  Respiratory: Negative  Negative for cough, shortness of breath, sputum production and wheezing  Gastrointestinal: Negative for abdominal pain, diarrhea, nausea and vomiting  Genitourinary: Negative for dysuria, flank pain, hematuria and incomplete emptying  Neurological: Negative for dizziness, focal weakness, headaches and weakness  Psychiatric/Behavioral: Negative for altered mental status, depression, hallucinations and hypervigilance         OBJECTIVE:  Current Weight: [email or web address] com:     There were no vitals taken for this visit  , There is no height or weight on file to calculate BMI  [unfilled]    Physical Exam: There were no vitals taken for this visit  Physical Exam  Constitutional:       General: He is not in acute distress  Appearance: He is not ill-appearing or toxic-appearing  HENT:      Head: Normocephalic and atraumatic  Nose: Nose normal       Mouth/Throat:      Mouth: Mucous membranes are moist    Eyes:      General: No scleral icterus  Extraocular Movements: Extraocular movements intact  Cardiovascular:      Rate and Rhythm: Normal rate and regular rhythm  Heart sounds: No friction rub  No gallop  Pulmonary:      Effort: Pulmonary effort is normal  No respiratory distress  Breath sounds: No wheezing, rhonchi or rales  Abdominal:      General: Bowel sounds are normal  There is no distension  Palpations: Abdomen is soft  Tenderness: There is no abdominal tenderness  There is no rebound  Musculoskeletal:      Cervical back: Normal range of motion and neck supple  Neurological:      General: No focal deficit present  Mental Status: He is alert and oriented to person, place, and time  Mental status is at baseline  Psychiatric:         Mood and Affect: Mood normal          Behavior: Behavior normal          Thought Content:  Thought content normal          Judgment: Judgment normal          Medications:    Current Outpatient Medications:   •  ALPRAZolam (XANAX) 0 25 mg tablet, Take 1 tablet by mouth daily as needed, Disp: , Rfl:   •  amLODIPine-benazepril (LOTREL) 10-20 MG per capsule, TAKE 1 CAPSULE BY MOUTH EVERY DAY, Disp: 90 capsule, Rfl: 3  •  Ascorbic Acid (VITAMIN C) 1000 MG tablet, Take 1 tablet by mouth daily, Disp: , Rfl:   •  aspirin 81 MG tablet, Take 1 tablet by mouth daily, Disp: , Rfl:   •  atorvastatin (LIPITOR) 10 mg tablet, Take 1 tablet by mouth Once a month (Patient not taking: Reported on 11/22/2021 ), Disp: , Rfl:   •  cyclobenzaprine (FLEXERIL) 10 mg tablet, Take 10 mg by mouth daily as needed for muscle spasms  , Disp: , Rfl:   •  fexofenadine (ALLEGRA) 60 MG tablet, Take by mouth if needed  , Disp: , Rfl:   •  fluticasone (FLONASE) 50 mcg/act nasal spray, 1 spray into each nostril daily, Disp: , Rfl:   •  fluticasone-salmeterol (ADVAIR DISKUS) 250-50 mcg/dose inhaler, Inhale 1 puff every 12 (twelve) hours, Disp: , Rfl:   •  fluticasone-salmeterol (ADVAIR, WIXELA) 250-50 mcg/dose inhaler, Inhale 1 puff 2 (two) times a day, Disp: , Rfl:   •  gabapentin (NEURONTIN) 100 mg capsule, Take 100 mg by mouth 3 (three) times a day as needed  , Disp: , Rfl:   •  labetalol (NORMODYNE) 200 mg tablet, TAKE 1 TABLET BY MOUTH TWICE A DAY, Disp: 180 tablet, Rfl: 3  •  Multiple Vitamin (MULTIVITAMIN) capsule, Take 1 capsule by mouth daily, Disp: , Rfl:   •  Omega-3 Fatty Acids (Fish Oil) 1200 MG CAPS, Take by mouth 2 (two) times a day, Disp: , Rfl:   •  traMADol (ULTRAM) 50 mg tablet, Take 50 mg by mouth 2 (two) times a day as needed for moderate pain  , Disp: , Rfl:   •  traMADol-acetaminophen (ULTRACET) 37 5-325 mg per tablet, Take 1 tablet by mouth every 6 (six) hours as needed, Disp: , Rfl: 0    Laboratory Results:  No results found for: WBC, HGB, HCT, MCV, PLT  Lab Results   Component Value Date    SODIUM 140 03/09/2018    K 4 4 03/09/2018     03/09/2018    CO2 21 03/09/2018    BUN 28 (A) 03/09/2018    CREATININE 2 54 (A) 03/09/2018    GLUC 105 03/09/2018    CALCIUM 9 1 03/09/2018     Lab Results   Component Value Date    CALCIUM 9 1 03/09/2018     No results found for: LABPROT

## 2023-03-06 NOTE — LETTER
March 6, 2023     Chucho Majano, [address] [facility]  [address]  [facility] [address]    Patient: Zee Setting   YOB: 1950   Date of Visit: 3/6/2023       Dear Dr Ra Rivera: Thank you for referring Roman Linder to me for evaluation  Below are my notes for this consultation  If you have questions, please do not hesitate to call me  I look forward to following your patient along with you  Sincerely,        Kinjal Mitchell MD        CC: No Recipients  Kinjal Mitchell MD  3/6/2023  2:10 PM  Sign when Signing Visit   NEPHROLOGY PROGRESS NOTE    Zee Setting 81 y o  male MRN: [id number]  Unit/Bed#:  Encounter: [id number]  Reason for Consult: Chronic kidney disease and hypertension    The patient is here for routine follow-up to assess his chronic low back pain he had a couple epidural injections and had a little bit of relief but not as much as he had hoped  Otherwise no complaints  ASSESSMENT/PLAN:  1  Renal    The patient has 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  His latest creatinine is 2 9 which is slightly higher than his baseline but he states he had blood work done directly after he was at the gym so he likely was a little volume depleted  Overall his creatinine appears stable he has had very low levels of proteinuria in the past so he likely has hypertensive nephrosclerosis especially given severely elevated blood pressure for many many years until came under better control  Continue current medications  Continue with blood pressure control  Avoids NSAIDs    2  Hypertension    Patient had difficulty control hypertension but is now been well controlled on medications including labetalol and  amlodipine benazepril  No changes continue to follow  SUBJECTIVE:  Review of Systems   Constitutional: Negative for chills, diaphoresis, fever and malaise/fatigue  HENT: Negative  Eyes: Negative  Cardiovascular: Negative    Negative for chest pain, dyspnea on exertion, orthopnea and palpitations  Respiratory: Negative  Negative for cough, shortness of breath, sputum production and wheezing  Gastrointestinal: Negative for abdominal pain, diarrhea, nausea and vomiting  Genitourinary: Negative for dysuria, flank pain, hematuria and incomplete emptying  Neurological: Negative for dizziness, focal weakness, headaches and weakness  Psychiatric/Behavioral: Negative for altered mental status, depression, hallucinations and hypervigilance  OBJECTIVE:  Current Weight:    [email or web address]:     There were no vitals taken for this visit  , There is no height or weight on file to calculate BMI  [unfilled]    Physical Exam: There were no vitals taken for this visit  Physical Exam  Constitutional:       General: He is not in acute distress  Appearance: He is not ill-appearing or toxic-appearing  HENT:      Head: Normocephalic and atraumatic  Nose: Nose normal       Mouth/Throat:      Mouth: Mucous membranes are moist    Eyes:      General: No scleral icterus  Extraocular Movements: Extraocular movements intact  Cardiovascular:      Rate and Rhythm: Normal rate and regular rhythm  Heart sounds: No friction rub  No gallop  Pulmonary:      Effort: Pulmonary effort is normal  No respiratory distress  Breath sounds: No wheezing, rhonchi or rales  Abdominal:      General: Bowel sounds are normal  There is no distension  Palpations: Abdomen is soft  Tenderness: There is no abdominal tenderness  There is no rebound  Musculoskeletal:      Cervical back: Normal range of motion and neck supple  Neurological:      General: No focal deficit present  Mental Status: He is alert and oriented to person, place, and time  Mental status is at baseline  Psychiatric:         Mood and Affect: Mood normal          Behavior: Behavior normal          Thought Content:  Thought content normal          Judgment: Judgment normal          Medications:    Current Outpatient Medications:   •  ALPRAZolam (XANAX) 0 25 mg tablet, Take 1 tablet by mouth daily as needed, Disp: , Rfl:   •  amLODIPine-benazepril (LOTREL) 10-20 MG per capsule, TAKE 1 CAPSULE BY MOUTH EVERY DAY, Disp: 90 capsule, Rfl: 3  •  Ascorbic Acid (VITAMIN C) 1000 MG tablet, Take 1 tablet by mouth daily, Disp: , Rfl:   •  aspirin 81 MG tablet, Take 1 tablet by mouth daily, Disp: , Rfl:   •  atorvastatin (LIPITOR) 10 mg tablet, Take 1 tablet by mouth Once a month (Patient not taking: Reported on 11/22/2021 ), Disp: , Rfl:   •  cyclobenzaprine (FLEXERIL) 10 mg tablet, Take 10 mg by mouth daily as needed for muscle spasms  , Disp: , Rfl:   •  fexofenadine (ALLEGRA) 60 MG tablet, Take by mouth if needed  , Disp: , Rfl:   •  fluticasone (FLONASE) 50 mcg/act nasal spray, 1 spray into each nostril daily, Disp: , Rfl:   •  fluticasone-salmeterol (ADVAIR DISKUS) 250-50 mcg/dose inhaler, Inhale 1 puff every 12 (twelve) hours, Disp: , Rfl:   •  fluticasone-salmeterol (ADVAIR, WIXELA) 250-50 mcg/dose inhaler, Inhale 1 puff 2 (two) times a day, Disp: , Rfl:   •  gabapentin (NEURONTIN) 100 mg capsule, Take 100 mg by mouth 3 (three) times a day as needed  , Disp: , Rfl:   •  labetalol (NORMODYNE) 200 mg tablet, TAKE 1 TABLET BY MOUTH TWICE A DAY, Disp: 180 tablet, Rfl: 3  •  Multiple Vitamin (MULTIVITAMIN) capsule, Take 1 capsule by mouth daily, Disp: , Rfl:   •  Omega-3 Fatty Acids (Fish Oil) 1200 MG CAPS, Take by mouth 2 (two) times a day, Disp: , Rfl:   •  traMADol (ULTRAM) 50 mg tablet, Take 50 mg by mouth 2 (two) times a day as needed for moderate pain  , Disp: , Rfl:   •  traMADol-acetaminophen (ULTRACET) 37 5-325 mg per tablet, Take 1 tablet by mouth every 6 (six) hours as needed, Disp: , Rfl: 0    Laboratory Results:  No results found for: WBC, HGB, HCT, MCV, PLT  Lab Results   Component Value Date    SODIUM 140 03/09/2018    K 4 4 03/09/2018     03/09/2018    CO2 21 03/09/2018    BUN 28 (A) 03/09/2018    CREATININE 2 54 (A) 03/09/2018    GLUC 105 03/09/2018    CALCIUM 9 1 03/09/2018     Lab Results   Component Value Date    CALCIUM 9 1 03/09/2018     No results found for: LABPROT

## 2023-09-05 ENCOUNTER — OFFICE VISIT (OUTPATIENT)
Dept: NEPHROLOGY | Facility: CLINIC | Age: 73
End: 2023-09-05
Payer: MEDICARE

## 2023-09-05 VITALS
BODY MASS INDEX: 32.3 KG/M2 | HEART RATE: 78 BPM | HEIGHT: 66 IN | WEIGHT: 201 LBS | SYSTOLIC BLOOD PRESSURE: 138 MMHG | DIASTOLIC BLOOD PRESSURE: 82 MMHG

## 2023-09-05 DIAGNOSIS — N18.9 CHRONIC KIDNEY DISEASE, UNSPECIFIED CKD STAGE: Primary | ICD-10-CM

## 2023-09-05 DIAGNOSIS — I10 HYPERTENSION, UNSPECIFIED TYPE: ICD-10-CM

## 2023-09-05 PROCEDURE — 99214 OFFICE O/P EST MOD 30 MIN: CPT | Performed by: INTERNAL MEDICINE

## 2023-09-05 RX ORDER — BUDESONIDE AND FORMOTEROL FUMARATE DIHYDRATE 80; 4.5 UG/1; UG/1
2 AEROSOL RESPIRATORY (INHALATION) 2 TIMES DAILY
COMMUNITY

## 2023-09-05 RX ORDER — EZETIMIBE 10 MG/1
10 TABLET ORAL DAILY
COMMUNITY

## 2023-09-05 NOTE — LETTER
September 5, 2023     Beau Larsen, [address] [facility]  [address]  [place] [address]    Patient: Colin Rajan   YOB: 1950   Date of Visit: 9/5/2023       Dear Dr. Zeyad Anaya: Thank you for referring Deysi Ricci to me for evaluation. Below are my notes for this consultation. If you have questions, please do not hesitate to call me. I look forward to following your patient along with you. Sincerely,        Jono Lama MD        CC: No Recipients    Jono Lama MD  9/5/2023  4:36 PM  Sign when Signing Visit  NEPHROLOGY PROGRESS NOTE    Colin Rajan 81 y.o. male MRN: [id number]  Unit/Bed#:  Encounter: [id number]  Reason for Consult: Chronic kidney disease and hypertension    Patient is here for follow-up he states he was having significant back pain and some injections were not helping but then recently he had the injections and states that it made a big difference as they were in a different area. Other than that no acute changes or complaints. ASSESSMENT/PLAN:  1. Renal    Patient is chronic kidney disease with low levels of proteinuria in the past and he had many decades of severe hypertension prior to me meeting him. I say this because I suspect he has underlying hypertensive related kidney disease. His latest creatinine is 2.9 which is lower than it was a year ago so he has had no significant progression. Otherwise electrolytes are normal.  We will continue to monitor but has had no progression. Continue with blood pressure control    2. Hypertension    Patient now has control blood pressure he monitors at home and generally with systolics running in the 565 range. He will continue with his current regiment with no changes and I told him to call me if there is any changes in his overall health status or increase in blood pressure. SUBJECTIVE:  Review of Systems   Constitutional: Negative for chills, decreased appetite, diaphoresis and fever. HENT: Negative. Eyes: Negative. Cardiovascular: Negative. Negative for chest pain, dyspnea on exertion, leg swelling and orthopnea. Respiratory: Negative. Negative for cough, shortness of breath, sputum production and wheezing. Gastrointestinal: Negative for abdominal pain, diarrhea, nausea and vomiting. Genitourinary: Negative for dysuria, flank pain, hematuria and incomplete emptying. Neurological: Negative for dizziness, focal weakness, headaches and weakness. Psychiatric/Behavioral: Negative for altered mental status, hallucinations and hypervigilance. The patient is not nervous/anxious. OBJECTIVE:  Current Weight:    [email or web address]:     There were no vitals taken for this visit. , There is no height or weight on file to calculate BMI. [unfilled]    Physical Exam: There were no vitals taken for this visit. Physical Exam  Constitutional:       General: He is not in acute distress. Appearance: He is not toxic-appearing or diaphoretic. HENT:      Head: Normocephalic and atraumatic. Nose: Nose normal.      Mouth/Throat:      Mouth: Mucous membranes are moist.   Eyes:      General: No scleral icterus. Extraocular Movements: Extraocular movements intact. Cardiovascular:      Rate and Rhythm: Normal rate and regular rhythm. Heart sounds: Murmur heard. No friction rub. No gallop. Pulmonary:      Effort: Pulmonary effort is normal. No respiratory distress. Breath sounds: No wheezing, rhonchi or rales. Abdominal:      General: Bowel sounds are normal. There is no distension. Palpations: Abdomen is soft. Tenderness: There is no abdominal tenderness. There is no rebound. Musculoskeletal:      Cervical back: Normal range of motion and neck supple. Neurological:      General: No focal deficit present. Mental Status: He is alert and oriented to person, place, and time. Mental status is at baseline.    Psychiatric:         Mood and Affect: Mood normal.         Behavior: Behavior normal.         Thought Content:  Thought content normal.         Judgment: Judgment normal.         Medications:    Current Outpatient Medications:   •  ALPRAZolam (XANAX) 0.25 mg tablet, Take 1 tablet by mouth daily as needed, Disp: , Rfl:   •  amLODIPine-benazepril (LOTREL) 10-20 MG per capsule, Take 1 capsule by mouth daily, Disp: 90 capsule, Rfl: 3  •  Ascorbic Acid (VITAMIN C) 1000 MG tablet, Take 1 tablet by mouth daily, Disp: , Rfl:   •  aspirin 81 MG tablet, Take 1 tablet by mouth daily, Disp: , Rfl:   •  atorvastatin (LIPITOR) 10 mg tablet, Take 1 tablet by mouth Once a month (Patient not taking: Reported on 11/22/2021), Disp: , Rfl:   •  cyclobenzaprine (FLEXERIL) 10 mg tablet, Take 10 mg by mouth daily as needed for muscle spasms  , Disp: , Rfl:   •  fexofenadine (ALLEGRA) 60 MG tablet, Take by mouth if needed  , Disp: , Rfl:   •  fluticasone (FLONASE) 50 mcg/act nasal spray, 1 spray into each nostril daily, Disp: , Rfl:   •  fluticasone-salmeterol (Advair) 250-50 mcg/dose inhaler, Inhale 1 puff every 12 (twelve) hours, Disp: , Rfl:   •  fluticasone-salmeterol (ADVAIR, WIXELA) 250-50 mcg/dose inhaler, Inhale 1 puff 2 (two) times a day, Disp: , Rfl:   •  gabapentin (NEURONTIN) 100 mg capsule, Take 100 mg by mouth 3 (three) times a day as needed  , Disp: , Rfl:   •  labetalol (NORMODYNE) 200 mg tablet, Take 1 tablet (200 mg total) by mouth 2 (two) times a day, Disp: 180 tablet, Rfl: 3  •  Multiple Vitamin (MULTIVITAMIN) capsule, Take 1 capsule by mouth daily, Disp: , Rfl:   •  Omega-3 Fatty Acids (Fish Oil) 1200 MG CAPS, Take by mouth 2 (two) times a day, Disp: , Rfl:   •  traMADol (ULTRAM) 50 mg tablet, Take 50 mg by mouth 2 (two) times a day as needed for moderate pain  , Disp: , Rfl:   •  traMADol-acetaminophen (ULTRACET) 37.5-325 mg per tablet, Take 1 tablet by mouth every 6 (six) hours as needed, Disp: , Rfl: 0  •  VITAMIN D PO, Take by mouth, Disp: , Rfl:     Laboratory Results:  No results found for: "WBC", "HGB", "HCT", "MCV", "PLT"  Lab Results   Component Value Date    SODIUM 140 03/09/2018    K 4.4 03/09/2018     03/09/2018    CO2 21 03/09/2018    BUN 28 (A) 03/09/2018    CREATININE 2.54 (A) 03/09/2018    GLUC 105 03/09/2018    CALCIUM 9.1 03/09/2018     Lab Results   Component Value Date    CALCIUM 9.1 03/09/2018     No results found for: "LABPROT"

## 2023-09-05 NOTE — PATIENT INSTRUCTIONS
You are here for follow-up and glad you been feeling well and especially said your recent injections have helped your back pain a lot. Your blood pressures been under good control at home and it is about the same level as I got today so is well controlled on your current medications. Your creatinine is 2.9 looking back a year even 2 years ago it is a little better now than it was so it is no progression. I think the biggest thing was your very difficult blood pressure control in years past had damage to the kidneys and at this point the blood pressure is under good control so hopefully we will just see slow progression if any at all. Labs and follow-up as scheduled please call if you have any problems or concerns before next visit.

## 2023-09-05 NOTE — PROGRESS NOTES
NEPHROLOGY PROGRESS NOTE    Yoly Vasquez 81 y.o. male MRN: [id number]  Unit/Bed#:  Encounter: [id number]  Reason for Consult: Chronic kidney disease and hypertension    Patient is here for follow-up he states he was having significant back pain and some injections were not helping but then recently he had the injections and states that it made a big difference as they were in a different area. Other than that no acute changes or complaints. ASSESSMENT/PLAN:  1. Renal    Patient is chronic kidney disease with low levels of proteinuria in the past and he had many decades of severe hypertension prior to me meeting him. I say this because I suspect he has underlying hypertensive related kidney disease. His latest creatinine is 2.9 which is lower than it was a year ago so he has had no significant progression. Otherwise electrolytes are normal.  We will continue to monitor but has had no progression. Continue with blood pressure control    2. Hypertension    Patient now has control blood pressure he monitors at home and generally with systolics running in the 624 range. He will continue with his current regiment with no changes and I told him to call me if there is any changes in his overall health status or increase in blood pressure. SUBJECTIVE:  Review of Systems   Constitutional: Negative for chills, decreased appetite, diaphoresis and fever. HENT: Negative. Eyes: Negative. Cardiovascular: Negative. Negative for chest pain, dyspnea on exertion, leg swelling and orthopnea. Respiratory: Negative. Negative for cough, shortness of breath, sputum production and wheezing. Gastrointestinal: Negative for abdominal pain, diarrhea, nausea and vomiting. Genitourinary: Negative for dysuria, flank pain, hematuria and incomplete emptying. Neurological: Negative for dizziness, focal weakness, headaches and weakness.    Psychiatric/Behavioral: Negative for altered mental status, hallucinations and hypervigilance. The patient is not nervous/anxious. OBJECTIVE:  Current Weight:    [email or web address]:     There were no vitals taken for this visit. , There is no height or weight on file to calculate BMI. [unfilled]    Physical Exam: There were no vitals taken for this visit. Physical Exam  Constitutional:       General: He is not in acute distress. Appearance: He is not toxic-appearing or diaphoretic. HENT:      Head: Normocephalic and atraumatic. Nose: Nose normal.      Mouth/Throat:      Mouth: Mucous membranes are moist.   Eyes:      General: No scleral icterus. Extraocular Movements: Extraocular movements intact. Cardiovascular:      Rate and Rhythm: Normal rate and regular rhythm. Heart sounds: Murmur heard. No friction rub. No gallop. Pulmonary:      Effort: Pulmonary effort is normal. No respiratory distress. Breath sounds: No wheezing, rhonchi or rales. Abdominal:      General: Bowel sounds are normal. There is no distension. Palpations: Abdomen is soft. Tenderness: There is no abdominal tenderness. There is no rebound. Musculoskeletal:      Cervical back: Normal range of motion and neck supple. Neurological:      General: No focal deficit present. Mental Status: He is alert and oriented to person, place, and time. Mental status is at baseline. Psychiatric:         Mood and Affect: Mood normal.         Behavior: Behavior normal.         Thought Content:  Thought content normal.         Judgment: Judgment normal.         Medications:    Current Outpatient Medications:   •  ALPRAZolam (XANAX) 0.25 mg tablet, Take 1 tablet by mouth daily as needed, Disp: , Rfl:   •  amLODIPine-benazepril (LOTREL) 10-20 MG per capsule, Take 1 capsule by mouth daily, Disp: 90 capsule, Rfl: 3  •  Ascorbic Acid (VITAMIN C) 1000 MG tablet, Take 1 tablet by mouth daily, Disp: , Rfl:   •  aspirin 81 MG tablet, Take 1 tablet by mouth daily, Disp: , Rfl:   •  atorvastatin (LIPITOR) 10 mg tablet, Take 1 tablet by mouth Once a month (Patient not taking: Reported on 11/22/2021), Disp: , Rfl:   •  cyclobenzaprine (FLEXERIL) 10 mg tablet, Take 10 mg by mouth daily as needed for muscle spasms  , Disp: , Rfl:   •  fexofenadine (ALLEGRA) 60 MG tablet, Take by mouth if needed  , Disp: , Rfl:   •  fluticasone (FLONASE) 50 mcg/act nasal spray, 1 spray into each nostril daily, Disp: , Rfl:   •  fluticasone-salmeterol (Advair) 250-50 mcg/dose inhaler, Inhale 1 puff every 12 (twelve) hours, Disp: , Rfl:   •  fluticasone-salmeterol (ADVAIR, WIXELA) 250-50 mcg/dose inhaler, Inhale 1 puff 2 (two) times a day, Disp: , Rfl:   •  gabapentin (NEURONTIN) 100 mg capsule, Take 100 mg by mouth 3 (three) times a day as needed  , Disp: , Rfl:   •  labetalol (NORMODYNE) 200 mg tablet, Take 1 tablet (200 mg total) by mouth 2 (two) times a day, Disp: 180 tablet, Rfl: 3  •  Multiple Vitamin (MULTIVITAMIN) capsule, Take 1 capsule by mouth daily, Disp: , Rfl:   •  Omega-3 Fatty Acids (Fish Oil) 1200 MG CAPS, Take by mouth 2 (two) times a day, Disp: , Rfl:   •  traMADol (ULTRAM) 50 mg tablet, Take 50 mg by mouth 2 (two) times a day as needed for moderate pain  , Disp: , Rfl:   •  traMADol-acetaminophen (ULTRACET) 37.5-325 mg per tablet, Take 1 tablet by mouth every 6 (six) hours as needed, Disp: , Rfl: 0  •  VITAMIN D PO, Take by mouth, Disp: , Rfl:     Laboratory Results:  No results found for: "WBC", "HGB", "HCT", "MCV", "PLT"  Lab Results   Component Value Date    SODIUM 140 03/09/2018    K 4.4 03/09/2018     03/09/2018    CO2 21 03/09/2018    BUN 28 (A) 03/09/2018    CREATININE 2.54 (A) 03/09/2018    GLUC 105 03/09/2018    CALCIUM 9.1 03/09/2018     Lab Results   Component Value Date    CALCIUM 9.1 03/09/2018     No results found for: "LABPROT"

## 2024-01-04 ENCOUNTER — TELEPHONE (OUTPATIENT)
Dept: NEPHROLOGY | Facility: CLINIC | Age: 74
End: 2024-01-04

## 2024-01-04 NOTE — TELEPHONE ENCOUNTER
Message left on patient's VM that CR stable at 2.6 per Dr. Alex.    ----- Message from Christopher Alex MD sent at 1/4/2024 11:51 AM EST -----  Let him know creatinine stable 2.6 and happy new year.

## 2024-03-06 ENCOUNTER — OFFICE VISIT (OUTPATIENT)
Dept: NEPHROLOGY | Facility: CLINIC | Age: 74
End: 2024-03-06
Payer: MEDICARE

## 2024-03-06 VITALS
DIASTOLIC BLOOD PRESSURE: 80 MMHG | WEIGHT: 202 LBS | SYSTOLIC BLOOD PRESSURE: 136 MMHG | HEIGHT: 66 IN | HEART RATE: 78 BPM | BODY MASS INDEX: 32.47 KG/M2

## 2024-03-06 DIAGNOSIS — N18.9 CHRONIC KIDNEY DISEASE, UNSPECIFIED CKD STAGE: ICD-10-CM

## 2024-03-06 DIAGNOSIS — I10 HYPERTENSION, UNSPECIFIED TYPE: Primary | ICD-10-CM

## 2024-03-06 PROCEDURE — 99214 OFFICE O/P EST MOD 30 MIN: CPT | Performed by: INTERNAL MEDICINE

## 2024-03-06 NOTE — LETTER
March 6, 2024     Magdalena Pickens DO  [address] [facility]  [address]  [facility] [address]    Patient: Rakesh Spencer   YOB: 1950   Date of Visit: 3/6/2024       Dear Dr. Pickens:    Thank you for referring Rakesh Spencer to me for evaluation. Below are my notes for this consultation.    If you have questions, please do not hesitate to call me. I look forward to following your patient along with you.         Sincerely,        Christopher Alex MD        CC: No Recipients    Christopher Alex MD  3/6/2024  2:11 PM  Sign when Signing Visit  NEPHROLOGY PROGRESS NOTE    Rakesh Spencer 73 y.o. male MRN: [id number]  Unit/Bed#:  Encounter: [id number]  Reason for Consult: Hypertension and chronic kidney disease    The patient is here for follow-up and he told me he is really feeling poorly and its mostly due to musculoskeletal pain.  He is having low back pain and again is receiving some injections which are helping a little bit and is also has significant bilateral knee pain.  He is limited on the pain medication he can take.    ASSESSMENT/PLAN:  1.  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  He did not have heavy proteinuria so that is the most likely diagnosis.  His creatinine is 2.7 and this is relatively stable at his baseline over the years without rapid progression.  I told him the best way to help delay progression is maintaining good blood pressure control and that is really come under nice control with his medical therapy.  He is avoiding NSAIDs as he knows they can affect his kidney function.    Continue current medications  Managed with blood pressure control routine health management avoid NSAIDs  Labs and follow-up as scheduled    2.  Hypertension    Over the years we had to manipulate his regimen for  "tolerability and effectiveness.  At the present time the medications he is on he has excellent blood pressure control so no changes.    I have spent a total time of 32 minutes on 03/06/24 in caring for this patient including Diagnostic results, Instructions for management, Impressions, Reviewing / ordering tests, medicine, procedures  , and Obtaining or reviewing history  .         SUBJECTIVE:  Review of Systems   Constitutional: Negative for chills, diaphoresis and fever.   HENT: Negative.     Eyes: Negative.    Cardiovascular:  Negative for chest pain, dyspnea on exertion, leg swelling and orthopnea.   Respiratory:  Negative for cough, shortness of breath, sputum production and wheezing.    Musculoskeletal:  Positive for arthritis and back pain.        Knee pain.   Gastrointestinal:  Negative for abdominal pain, diarrhea, nausea and vomiting.   Genitourinary: Negative.    Neurological:  Negative for dizziness, focal weakness and headaches.   Psychiatric/Behavioral:  Negative for altered mental status, depression, hallucinations and hypervigilance.        OBJECTIVE:  Current Weight: Weight - Scale: 91.6 kg (202 lb)  Vitals:@TMAX(24)@Current:     Blood pressure 136/80, pulse 78, height 5' 6\" (1.676 m), weight 91.6 kg (202 lb). , Body mass index is 32.6 kg/m².    [unfilled]    Physical Exam: /80 (BP Location: Right arm, Patient Position: Sitting, Cuff Size: Standard)   Pulse 78   Ht 5' 6\" (1.676 m)   Wt 91.6 kg (202 lb)   BMI 32.60 kg/m²   Physical Exam  Constitutional:       General: He is not in acute distress.     Appearance: He is not toxic-appearing or diaphoretic.   HENT:      Head: Normocephalic and atraumatic.      Nose: Nose normal.      Mouth/Throat:      Mouth: Mucous membranes are moist.   Eyes:      General: No scleral icterus.     Extraocular Movements: Extraocular movements intact.   Cardiovascular:      Rate and Rhythm: Normal rate and regular rhythm.      Heart sounds:      No friction rub. " No gallop.   Pulmonary:      Effort: Pulmonary effort is normal. No respiratory distress.      Breath sounds: No wheezing, rhonchi or rales.   Abdominal:      General: Bowel sounds are normal. There is no distension.      Palpations: Abdomen is soft.      Tenderness: There is no abdominal tenderness. There is no rebound.   Musculoskeletal:      Cervical back: Normal range of motion and neck supple.   Neurological:      General: No focal deficit present.      Mental Status: He is alert. Mental status is at baseline.   Psychiatric:         Mood and Affect: Mood normal.         Behavior: Behavior normal.         Thought Content: Thought content normal.         Medications:    Current Outpatient Medications:   •  ALPRAZolam (XANAX) 0.25 mg tablet, Take 1 tablet by mouth daily as needed, Disp: , Rfl:   •  amLODIPine-benazepril (LOTREL) 10-20 MG per capsule, Take 1 capsule by mouth daily, Disp: 90 capsule, Rfl: 3  •  Ascorbic Acid (VITAMIN C) 1000 MG tablet, Take 1 tablet by mouth daily, Disp: , Rfl:   •  aspirin 81 MG tablet, Take 1 tablet by mouth daily, Disp: , Rfl:   •  budesonide-formoterol (SYMBICORT) 80-4.5 MCG/ACT inhaler, Inhale 2 puffs 2 (two) times a day Rinse mouth after use., Disp: , Rfl:   •  cyclobenzaprine (FLEXERIL) 10 mg tablet, Take 10 mg by mouth daily as needed for muscle spasms  , Disp: , Rfl:   •  ezetimibe (ZETIA) 10 mg tablet, Take 10 mg by mouth daily, Disp: , Rfl:   •  fexofenadine (ALLEGRA) 60 MG tablet, Take by mouth if needed  , Disp: , Rfl:   •  fluticasone (FLONASE) 50 mcg/act nasal spray, 1 spray into each nostril daily, Disp: , Rfl:   •  gabapentin (NEURONTIN) 100 mg capsule, Take 100 mg by mouth 3 (three) times a day as needed  , Disp: , Rfl:   •  labetalol (NORMODYNE) 200 mg tablet, Take 1 tablet (200 mg total) by mouth 2 (two) times a day, Disp: 180 tablet, Rfl: 3  •  Multiple Vitamin (MULTIVITAMIN) capsule, Take 1 capsule by mouth daily, Disp: , Rfl:   •  Omega-3 Fatty Acids (Fish  "Oil) 1200 MG CAPS, Take by mouth 2 (two) times a day, Disp: , Rfl:   •  traMADol-acetaminophen (ULTRACET) 37.5-325 mg per tablet, Take 1 tablet by mouth every 6 (six) hours as needed, Disp: , Rfl: 0  •  VITAMIN D PO, Take by mouth, Disp: , Rfl:   •  atorvastatin (LIPITOR) 10 mg tablet, Take 1 tablet by mouth Once a month (Patient not taking: Reported on 11/22/2021), Disp: , Rfl:   •  fluticasone-salmeterol (Advair) 250-50 mcg/dose inhaler, Inhale 1 puff every 12 (twelve) hours (Patient not taking: Reported on 9/5/2023), Disp: , Rfl:   •  fluticasone-salmeterol (ADVAIR, WIXELA) 250-50 mcg/dose inhaler, Inhale 1 puff 2 (two) times a day (Patient not taking: Reported on 9/5/2023), Disp: , Rfl:   •  traMADol (ULTRAM) 50 mg tablet, Take 50 mg by mouth 2 (two) times a day as needed for moderate pain   (Patient not taking: Reported on 3/6/2024), Disp: , Rfl:     Laboratory Results:  No results found for: \"WBC\", \"HGB\", \"HCT\", \"MCV\", \"PLT\"  Lab Results   Component Value Date    SODIUM 140 03/01/2024    K 4.2 03/01/2024     03/01/2024    CO2 24 03/01/2024    BUN 25 03/01/2024    CREATININE 2.72 (H) 03/01/2024    GLUC 80 03/01/2024    CALCIUM 9.8 03/01/2024     Lab Results   Component Value Date    CALCIUM 9.8 03/01/2024     No results found for: \"LABPROT\"      "

## 2024-03-06 NOTE — PROGRESS NOTES
NEPHROLOGY PROGRESS NOTE    Rakesh Spencer 73 y.o. male MRN: [id number]  Unit/Bed#:  Encounter: [id number]  Reason for Consult: Hypertension and chronic kidney disease    The patient is here for follow-up and he told me he is really feeling poorly and its mostly due to musculoskeletal pain.  He is having low back pain and again is receiving some injections which are helping a little bit and is also has significant bilateral knee pain.  He is limited on the pain medication he can take.    ASSESSMENT/PLAN:  1.  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  He did not have heavy proteinuria so that is the most likely diagnosis.  His creatinine is 2.7 and this is relatively stable at his baseline over the years without rapid progression.  I told him the best way to help delay progression is maintaining good blood pressure control and that is really come under nice control with his medical therapy.  He is avoiding NSAIDs as he knows they can affect his kidney function.    Continue current medications  Managed with blood pressure control routine health management avoid NSAIDs  Labs and follow-up as scheduled    2.  Hypertension    Over the years we had to manipulate his regimen for tolerability and effectiveness.  At the present time the medications he is on he has excellent blood pressure control so no changes.    I have spent a total time of 32 minutes on 03/06/24 in caring for this patient including Diagnostic results, Instructions for management, Impressions, Reviewing / ordering tests, medicine, procedures  , and Obtaining or reviewing history  .         SUBJECTIVE:  Review of Systems   Constitutional: Negative for chills, diaphoresis and fever.   HENT: Negative.     Eyes: Negative.    Cardiovascular:  Negative for chest  "pain, dyspnea on exertion, leg swelling and orthopnea.   Respiratory:  Negative for cough, shortness of breath, sputum production and wheezing.    Musculoskeletal:  Positive for arthritis and back pain.        Knee pain.   Gastrointestinal:  Negative for abdominal pain, diarrhea, nausea and vomiting.   Genitourinary: Negative.    Neurological:  Negative for dizziness, focal weakness and headaches.   Psychiatric/Behavioral:  Negative for altered mental status, depression, hallucinations and hypervigilance.        OBJECTIVE:  Current Weight: Weight - Scale: 91.6 kg (202 lb)  Vitals:@TMAX(24)@Current:     Blood pressure 136/80, pulse 78, height 5' 6\" (1.676 m), weight 91.6 kg (202 lb). , Body mass index is 32.6 kg/m².    [unfilled]    Physical Exam: /80 (BP Location: Right arm, Patient Position: Sitting, Cuff Size: Standard)   Pulse 78   Ht 5' 6\" (1.676 m)   Wt 91.6 kg (202 lb)   BMI 32.60 kg/m²   Physical Exam  Constitutional:       General: He is not in acute distress.     Appearance: He is not toxic-appearing or diaphoretic.   HENT:      Head: Normocephalic and atraumatic.      Nose: Nose normal.      Mouth/Throat:      Mouth: Mucous membranes are moist.   Eyes:      General: No scleral icterus.     Extraocular Movements: Extraocular movements intact.   Cardiovascular:      Rate and Rhythm: Normal rate and regular rhythm.      Heart sounds:      No friction rub. No gallop.   Pulmonary:      Effort: Pulmonary effort is normal. No respiratory distress.      Breath sounds: No wheezing, rhonchi or rales.   Abdominal:      General: Bowel sounds are normal. There is no distension.      Palpations: Abdomen is soft.      Tenderness: There is no abdominal tenderness. There is no rebound.   Musculoskeletal:      Cervical back: Normal range of motion and neck supple.   Neurological:      General: No focal deficit present.      Mental Status: He is alert. Mental status is at baseline.   Psychiatric:         Mood and " Affect: Mood normal.         Behavior: Behavior normal.         Thought Content: Thought content normal.         Medications:    Current Outpatient Medications:     ALPRAZolam (XANAX) 0.25 mg tablet, Take 1 tablet by mouth daily as needed, Disp: , Rfl:     amLODIPine-benazepril (LOTREL) 10-20 MG per capsule, Take 1 capsule by mouth daily, Disp: 90 capsule, Rfl: 3    Ascorbic Acid (VITAMIN C) 1000 MG tablet, Take 1 tablet by mouth daily, Disp: , Rfl:     aspirin 81 MG tablet, Take 1 tablet by mouth daily, Disp: , Rfl:     budesonide-formoterol (SYMBICORT) 80-4.5 MCG/ACT inhaler, Inhale 2 puffs 2 (two) times a day Rinse mouth after use., Disp: , Rfl:     cyclobenzaprine (FLEXERIL) 10 mg tablet, Take 10 mg by mouth daily as needed for muscle spasms  , Disp: , Rfl:     ezetimibe (ZETIA) 10 mg tablet, Take 10 mg by mouth daily, Disp: , Rfl:     fexofenadine (ALLEGRA) 60 MG tablet, Take by mouth if needed  , Disp: , Rfl:     fluticasone (FLONASE) 50 mcg/act nasal spray, 1 spray into each nostril daily, Disp: , Rfl:     gabapentin (NEURONTIN) 100 mg capsule, Take 100 mg by mouth 3 (three) times a day as needed  , Disp: , Rfl:     labetalol (NORMODYNE) 200 mg tablet, Take 1 tablet (200 mg total) by mouth 2 (two) times a day, Disp: 180 tablet, Rfl: 3    Multiple Vitamin (MULTIVITAMIN) capsule, Take 1 capsule by mouth daily, Disp: , Rfl:     Omega-3 Fatty Acids (Fish Oil) 1200 MG CAPS, Take by mouth 2 (two) times a day, Disp: , Rfl:     traMADol-acetaminophen (ULTRACET) 37.5-325 mg per tablet, Take 1 tablet by mouth every 6 (six) hours as needed, Disp: , Rfl: 0    VITAMIN D PO, Take by mouth, Disp: , Rfl:     atorvastatin (LIPITOR) 10 mg tablet, Take 1 tablet by mouth Once a month (Patient not taking: Reported on 11/22/2021), Disp: , Rfl:     fluticasone-salmeterol (Advair) 250-50 mcg/dose inhaler, Inhale 1 puff every 12 (twelve) hours (Patient not taking: Reported on 9/5/2023), Disp: , Rfl:     fluticasone-salmeterol  "(ADVAIR, WIXELA) 250-50 mcg/dose inhaler, Inhale 1 puff 2 (two) times a day (Patient not taking: Reported on 9/5/2023), Disp: , Rfl:     traMADol (ULTRAM) 50 mg tablet, Take 50 mg by mouth 2 (two) times a day as needed for moderate pain   (Patient not taking: Reported on 3/6/2024), Disp: , Rfl:     Laboratory Results:  No results found for: \"WBC\", \"HGB\", \"HCT\", \"MCV\", \"PLT\"  Lab Results   Component Value Date    SODIUM 140 03/01/2024    K 4.2 03/01/2024     03/01/2024    CO2 24 03/01/2024    BUN 25 03/01/2024    CREATININE 2.72 (H) 03/01/2024    GLUC 80 03/01/2024    CALCIUM 9.8 03/01/2024     Lab Results   Component Value Date    CALCIUM 9.8 03/01/2024     No results found for: \"LABPROT\"      "

## 2024-03-06 NOTE — PATIENT INSTRUCTIONS
You are here for follow-up am sorry to hear that your arthritis is really acting up.  We discussed other alternatives to try and reduce the pain such as tramadol capsaicin tumeric CBD topical Voltaren and you have tried them all.  CC really well educated on what helps and what does not.  You do have arthritis in the knee so in my opinion that problems not really going to get worse but you are trying to help the pain.  You do know that you might need to see an orthopedist about the knees in the future.    Your blood pressure is under excellent control  Your creatinine level was 2.7 and as you noted there is little ups and downs I suspect that is due to subtle dehydration when you are fasting because it comes back down when you check it and it is not fasting and it has been stable.  In the past your high blood pressure had injured the kidney some but now that it is under control it should help prevent that.

## 2024-04-24 DIAGNOSIS — I10 HYPERTENSION, UNSPECIFIED TYPE: ICD-10-CM

## 2024-04-25 RX ORDER — LABETALOL 200 MG/1
TABLET, FILM COATED ORAL
Qty: 180 TABLET | Refills: 1 | Status: SHIPPED | OUTPATIENT
Start: 2024-04-25

## 2024-04-25 RX ORDER — AMLODIPINE BESYLATE AND BENAZEPRIL HYDROCHLORIDE 10; 20 MG/1; MG/1
1 CAPSULE ORAL DAILY
Qty: 90 CAPSULE | Refills: 1 | Status: SHIPPED | OUTPATIENT
Start: 2024-04-25

## 2024-08-13 NOTE — TELEPHONE ENCOUNTER
Left message for patient to schedule march follow up  Patient scheduled to see JEAN PAUL Corona on 08/14/2024 at 11:00 AM.

## 2024-08-29 LAB
ANION GAP SERPL CALCULATED.3IONS-SCNC: 10 MMOL/L (ref 3–11)
BUN SERPL-MCNC: 32 MG/DL (ref 7–28)
CALCIUM SERPL-MCNC: 9.7 MG/DL (ref 8.5–10.1)
CHLORIDE SERPL-SCNC: 105 MMOL/L (ref 100–109)
CO2 SERPL-SCNC: 26 MMOL/L (ref 21–31)
CREAT SERPL-MCNC: 2.65 MG/DL (ref 0.53–1.3)
CYTOLOGY CMNT CVX/VAG CYTO-IMP: ABNORMAL
GFR/BSA.PRED SERPLBLD CYS-BASED-ARV: 25 ML/MIN/{1.73_M2}
GLUCOSE SERPL-MCNC: 73 MG/DL (ref 65–99)
POTASSIUM SERPL-SCNC: 4.7 MMOL/L (ref 3.5–5.2)
SODIUM SERPL-SCNC: 141 MMOL/L (ref 135–145)

## 2024-10-05 DIAGNOSIS — I10 HYPERTENSION, UNSPECIFIED TYPE: ICD-10-CM

## 2024-10-07 RX ORDER — AMLODIPINE AND BENAZEPRIL HYDROCHLORIDE 10; 20 MG/1; MG/1
1 CAPSULE ORAL DAILY
Qty: 30 CAPSULE | Refills: 0 | Status: SHIPPED | OUTPATIENT
Start: 2024-10-07

## 2024-10-07 RX ORDER — LABETALOL 200 MG/1
200 TABLET, FILM COATED ORAL 2 TIMES DAILY
Qty: 60 TABLET | Refills: 0 | Status: SHIPPED | OUTPATIENT
Start: 2024-10-07

## 2024-10-31 ENCOUNTER — OFFICE VISIT (OUTPATIENT)
Dept: NEPHROLOGY | Facility: CLINIC | Age: 74
End: 2024-10-31
Payer: MEDICARE

## 2024-10-31 VITALS
HEIGHT: 66 IN | WEIGHT: 186 LBS | SYSTOLIC BLOOD PRESSURE: 146 MMHG | HEART RATE: 70 BPM | BODY MASS INDEX: 29.89 KG/M2 | DIASTOLIC BLOOD PRESSURE: 80 MMHG

## 2024-10-31 DIAGNOSIS — I10 HYPERTENSION, UNSPECIFIED TYPE: ICD-10-CM

## 2024-10-31 DIAGNOSIS — N18.9 CHRONIC KIDNEY DISEASE, UNSPECIFIED CKD STAGE: Primary | ICD-10-CM

## 2024-10-31 PROCEDURE — 99214 OFFICE O/P EST MOD 30 MIN: CPT | Performed by: INTERNAL MEDICINE

## 2024-10-31 NOTE — PATIENT INSTRUCTIONS
You are here for follow-up I am sorry to hear you are going through the horrible arthritis and back pain again.  Your blood pressure is doing well which is nice and your creatinine which is the blood test for the kidney function is stable around 2.6 and I showed even going back more than 5 years it is stable if not a little better so there is been no progression.  From your history of very difficult to control blood pressure and very high levels overall the years it is likely the cause of this so keeping the blood pressure down is helping delay damage.    It is okay to get an MRI with the contrast.  The contrast they used for MRI is different and CAT scan dye and that something that you would have to think a little bit about if you would receive that.    Continue your current meds call me if there is any problems or concerns before next visit.

## 2024-10-31 NOTE — LETTER
November 4, 2024     Magdalena Pickens DO  [address] [facility]  [address] [place] [id number]    Patient: Rakesh Spencer   YOB: 1950   Date of Visit: 10/31/2024       Dear Dr. Pickens:    Thank you for referring Rakesh Spencer to me for evaluation. Below are my notes for this consultation.    If you have questions, please do not hesitate to call me. I look forward to following your patient along with you.         Sincerely,        Christopher Alex MD        CC: No Recipients    Christopher Alex MD  11/4/2024  8:06 AM  Sign when Signing Visit  NEPHROLOGY PROGRESS NOTE    Rakesh Spencer 73 y.o. male MRN: [id number]  Unit/Bed#:  Encounter: [id number]  Reason for Consult: Chronic kidney disease and hypertension    The patient is here for follow-up he looks like he is lost a little bit of weight but he says he has been in significant pain again in his back.  He has had procedures done including a scraping of the foramen really just has a lot of pain and it is not really helping.  No hospitalizations.  He is not using NSAIDs.    ASSESSMENT/PLAN:  1.  Renal    Patient is chronic kidney disease 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 as he has had severe hypertension for many years prior to us getting under control with medications.  Looking back years his creatinine stable at 2.6 with no progression and in fact it was higher in the past.  Insignificant proteinuria suggests nonglomerular process and likely again due to hypertension.    At this point continue current medications he is avoiding NSAIDs creatinine stable 2.6 will continue to monitor.    2.  Hypertension    Patient's blood pressure is under good control on his current medical regimen which she is tolerating.  For now we will continue his current medications with no changes at home and call if there is any problems before next visit.    I have spent a total time of 30 minutes in caring for this patient on the day of the visit/encounter including  "Diagnostic results, Patient and family education, Reviewing / ordering tests, medicine, procedures  , and Obtaining or reviewing history  .         SUBJECTIVE:  Review of Systems   Constitutional: Negative for chills, diaphoresis and fever.   HENT: Negative.     Eyes: Negative.    Cardiovascular:  Negative for chest pain, dyspnea on exertion and leg swelling.   Respiratory: Negative.  Negative for cough, shortness of breath and sputum production.    Musculoskeletal:  Positive for arthritis and back pain.   Gastrointestinal:  Negative for abdominal pain, diarrhea, nausea and vomiting.   Genitourinary: Negative.    Neurological:  Negative for dizziness and headaches.   Psychiatric/Behavioral:  Negative for altered mental status.        OBJECTIVE:  Current Weight: Weight - Scale: 84.4 kg (186 lb)  Vitals:@TMAX(24)@Current:     Blood pressure 146/80, pulse 70, height 5' 6\" (1.676 m), weight 84.4 kg (186 lb). , Body mass index is 30.02 kg/m².    [unfilled]    Physical Exam: /80 (BP Location: Right arm, Patient Position: Sitting, Cuff Size: Standard)   Pulse 70   Ht 5' 6\" (1.676 m)   Wt 84.4 kg (186 lb)   BMI 30.02 kg/m²   Physical Exam  Constitutional:       General: He is not in acute distress.     Appearance: He is not toxic-appearing.   HENT:      Head: Normocephalic and atraumatic.      Nose: Nose normal.      Mouth/Throat:      Mouth: Mucous membranes are moist.   Eyes:      General: No scleral icterus.     Extraocular Movements: Extraocular movements intact.   Cardiovascular:      Rate and Rhythm: Normal rate and regular rhythm.      Heart sounds: Murmur heard.   Pulmonary:      Effort: Pulmonary effort is normal. No respiratory distress.      Breath sounds: No wheezing or rales.   Abdominal:      General: Bowel sounds are normal. There is no distension.      Palpations: Abdomen is soft.      Tenderness: There is no abdominal tenderness.   Musculoskeletal:      Cervical back: Normal range of motion and " neck supple.   Neurological:      Mental Status: He is alert and oriented to person, place, and time.   Psychiatric:         Mood and Affect: Mood normal.         Behavior: Behavior normal.         Medications:    Current Outpatient Medications:   •  ALPRAZolam (XANAX) 0.25 mg tablet, Take 1 tablet by mouth daily as needed, Disp: , Rfl:   •  amLODIPine-benazepril (LOTREL) 10-20 MG per capsule, TAKE 1 CAPSULE BY MOUTH EVERY DAY, Disp: 30 capsule, Rfl: 0  •  Ascorbic Acid (VITAMIN C) 1000 MG tablet, Take 1 tablet by mouth daily, Disp: , Rfl:   •  aspirin 81 MG tablet, Take 1 tablet by mouth daily, Disp: , Rfl:   •  cyclobenzaprine (FLEXERIL) 10 mg tablet, Take 10 mg by mouth daily as needed for muscle spasms  , Disp: , Rfl:   •  fexofenadine (ALLEGRA) 60 MG tablet, Take by mouth if needed  , Disp: , Rfl:   •  fluticasone (FLONASE) 50 mcg/act nasal spray, 1 spray into each nostril daily, Disp: , Rfl:   •  gabapentin (NEURONTIN) 100 mg capsule, Take 100 mg by mouth 3 (three) times a day as needed  , Disp: , Rfl:   •  labetalol (NORMODYNE) 200 mg tablet, TAKE 1 TABLET BY MOUTH TWICE A DAY, Disp: 60 tablet, Rfl: 0  •  Multiple Vitamin (MULTIVITAMIN) capsule, Take 1 capsule by mouth daily, Disp: , Rfl:   •  Omega-3 Fatty Acids (Fish Oil) 1200 MG CAPS, Take by mouth 2 (two) times a day, Disp: , Rfl:   •  traMADol (ULTRAM) 50 mg tablet, Take 50 mg by mouth 2 (two) times a day as needed for moderate pain, Disp: , Rfl:   •  VITAMIN D PO, Take by mouth, Disp: , Rfl:   •  atorvastatin (LIPITOR) 10 mg tablet, Take 1 tablet by mouth Once a month (Patient not taking: Reported on 11/22/2021), Disp: , Rfl:   •  budesonide-formoterol (SYMBICORT) 80-4.5 MCG/ACT inhaler, Inhale 2 puffs 2 (two) times a day Rinse mouth after use. (Patient not taking: Reported on 10/31/2024), Disp: , Rfl:   •  ezetimibe (ZETIA) 10 mg tablet, Take 10 mg by mouth daily (Patient not taking: Reported on 10/31/2024), Disp: , Rfl:   •  fluticasone-salmeterol  "(Advair) 250-50 mcg/dose inhaler, Inhale 1 puff every 12 (twelve) hours (Patient not taking: Reported on 9/5/2023), Disp: , Rfl:   •  fluticasone-salmeterol (ADVAIR, WIXELA) 250-50 mcg/dose inhaler, Inhale 1 puff 2 (two) times a day (Patient not taking: Reported on 9/5/2023), Disp: , Rfl:   •  traMADol-acetaminophen (ULTRACET) 37.5-325 mg per tablet, Take 1 tablet by mouth every 6 (six) hours as needed (Patient not taking: Reported on 10/31/2024), Disp: , Rfl: 0    Laboratory Results:  No results found for: \"WBC\", \"HGB\", \"HCT\", \"MCV\", \"PLT\"  Lab Results   Component Value Date    SODIUM 141 08/29/2024    K 4.7 08/29/2024     08/29/2024    CO2 26 08/29/2024    BUN 32 (H) 08/29/2024    CREATININE 2.65 (H) 08/29/2024    GLUC 73 08/29/2024    CALCIUM 9.7 08/29/2024     Lab Results   Component Value Date    CALCIUM 9.7 08/29/2024     No results found for: \"LABPROT\"      "

## 2024-10-31 NOTE — PROGRESS NOTES
NEPHROLOGY PROGRESS NOTE    Rakesh Spencer 73 y.o. male MRN: [id number]  Unit/Bed#:  Encounter: [id number]  Reason for Consult: Chronic kidney disease and hypertension    The patient is here for follow-up he looks like he is lost a little bit of weight but he says he has been in significant pain again in his back.  He has had procedures done including a scraping of the foramen really just has a lot of pain and it is not really helping.  No hospitalizations.  He is not using NSAIDs.    ASSESSMENT/PLAN:  1.  Renal    Patient is chronic kidney disease 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 as he has had severe hypertension for many years prior to us getting under control with medications.  Looking back years his creatinine stable at 2.6 with no progression and in fact it was higher in the past.  Insignificant proteinuria suggests nonglomerular process and likely again due to hypertension.    At this point continue current medications he is avoiding NSAIDs creatinine stable 2.6 will continue to monitor.    2.  Hypertension    Patient's blood pressure is under good control on his current medical regimen which she is tolerating.  For now we will continue his current medications with no changes at home and call if there is any problems before next visit.    I have spent a total time of 30 minutes in caring for this patient on the day of the visit/encounter including Diagnostic results, Patient and family education, Reviewing / ordering tests, medicine, procedures  , and Obtaining or reviewing history  .         SUBJECTIVE:  Review of Systems   Constitutional: Negative for chills, diaphoresis and fever.   HENT: Negative.     Eyes: Negative.    Cardiovascular:  Negative for chest pain, dyspnea on exertion and leg swelling.   Respiratory: Negative.  Negative for cough, shortness of breath and sputum production.    Musculoskeletal:  Positive for arthritis and back pain.   Gastrointestinal:  Negative for abdominal pain,  "diarrhea, nausea and vomiting.   Genitourinary: Negative.    Neurological:  Negative for dizziness and headaches.   Psychiatric/Behavioral:  Negative for altered mental status.        OBJECTIVE:  Current Weight: Weight - Scale: 84.4 kg (186 lb)  Vitals:@TMAX(24)@Current:     Blood pressure 146/80, pulse 70, height 5' 6\" (1.676 m), weight 84.4 kg (186 lb). , Body mass index is 30.02 kg/m².    [unfilled]    Physical Exam: /80 (BP Location: Right arm, Patient Position: Sitting, Cuff Size: Standard)   Pulse 70   Ht 5' 6\" (1.676 m)   Wt 84.4 kg (186 lb)   BMI 30.02 kg/m²   Physical Exam  Constitutional:       General: He is not in acute distress.     Appearance: He is not toxic-appearing.   HENT:      Head: Normocephalic and atraumatic.      Nose: Nose normal.      Mouth/Throat:      Mouth: Mucous membranes are moist.   Eyes:      General: No scleral icterus.     Extraocular Movements: Extraocular movements intact.   Cardiovascular:      Rate and Rhythm: Normal rate and regular rhythm.      Heart sounds: Murmur heard.   Pulmonary:      Effort: Pulmonary effort is normal. No respiratory distress.      Breath sounds: No wheezing or rales.   Abdominal:      General: Bowel sounds are normal. There is no distension.      Palpations: Abdomen is soft.      Tenderness: There is no abdominal tenderness.   Musculoskeletal:      Cervical back: Normal range of motion and neck supple.   Neurological:      Mental Status: He is alert and oriented to person, place, and time.   Psychiatric:         Mood and Affect: Mood normal.         Behavior: Behavior normal.         Medications:    Current Outpatient Medications:     ALPRAZolam (XANAX) 0.25 mg tablet, Take 1 tablet by mouth daily as needed, Disp: , Rfl:     amLODIPine-benazepril (LOTREL) 10-20 MG per capsule, TAKE 1 CAPSULE BY MOUTH EVERY DAY, Disp: 30 capsule, Rfl: 0    Ascorbic Acid (VITAMIN C) 1000 MG tablet, Take 1 tablet by mouth daily, Disp: , Rfl:     aspirin 81 MG " "tablet, Take 1 tablet by mouth daily, Disp: , Rfl:     cyclobenzaprine (FLEXERIL) 10 mg tablet, Take 10 mg by mouth daily as needed for muscle spasms  , Disp: , Rfl:     fexofenadine (ALLEGRA) 60 MG tablet, Take by mouth if needed  , Disp: , Rfl:     fluticasone (FLONASE) 50 mcg/act nasal spray, 1 spray into each nostril daily, Disp: , Rfl:     gabapentin (NEURONTIN) 100 mg capsule, Take 100 mg by mouth 3 (three) times a day as needed  , Disp: , Rfl:     labetalol (NORMODYNE) 200 mg tablet, TAKE 1 TABLET BY MOUTH TWICE A DAY, Disp: 60 tablet, Rfl: 0    Multiple Vitamin (MULTIVITAMIN) capsule, Take 1 capsule by mouth daily, Disp: , Rfl:     Omega-3 Fatty Acids (Fish Oil) 1200 MG CAPS, Take by mouth 2 (two) times a day, Disp: , Rfl:     traMADol (ULTRAM) 50 mg tablet, Take 50 mg by mouth 2 (two) times a day as needed for moderate pain, Disp: , Rfl:     VITAMIN D PO, Take by mouth, Disp: , Rfl:     atorvastatin (LIPITOR) 10 mg tablet, Take 1 tablet by mouth Once a month (Patient not taking: Reported on 11/22/2021), Disp: , Rfl:     budesonide-formoterol (SYMBICORT) 80-4.5 MCG/ACT inhaler, Inhale 2 puffs 2 (two) times a day Rinse mouth after use. (Patient not taking: Reported on 10/31/2024), Disp: , Rfl:     ezetimibe (ZETIA) 10 mg tablet, Take 10 mg by mouth daily (Patient not taking: Reported on 10/31/2024), Disp: , Rfl:     fluticasone-salmeterol (Advair) 250-50 mcg/dose inhaler, Inhale 1 puff every 12 (twelve) hours (Patient not taking: Reported on 9/5/2023), Disp: , Rfl:     fluticasone-salmeterol (ADVAIR, WIXELA) 250-50 mcg/dose inhaler, Inhale 1 puff 2 (two) times a day (Patient not taking: Reported on 9/5/2023), Disp: , Rfl:     traMADol-acetaminophen (ULTRACET) 37.5-325 mg per tablet, Take 1 tablet by mouth every 6 (six) hours as needed (Patient not taking: Reported on 10/31/2024), Disp: , Rfl: 0    Laboratory Results:  No results found for: \"WBC\", \"HGB\", \"HCT\", \"MCV\", \"PLT\"  Lab Results   Component Value Date " "   SODIUM 141 08/29/2024    K 4.7 08/29/2024     08/29/2024    CO2 26 08/29/2024    BUN 32 (H) 08/29/2024    CREATININE 2.65 (H) 08/29/2024    GLUC 73 08/29/2024    CALCIUM 9.7 08/29/2024     Lab Results   Component Value Date    CALCIUM 9.7 08/29/2024     No results found for: \"LABPROT\"      "

## 2024-11-06 DIAGNOSIS — I10 HYPERTENSION, UNSPECIFIED TYPE: ICD-10-CM

## 2024-11-06 RX ORDER — LABETALOL 200 MG/1
200 TABLET, FILM COATED ORAL 2 TIMES DAILY
Qty: 180 TABLET | Refills: 1 | Status: SHIPPED | OUTPATIENT
Start: 2024-11-06

## 2024-11-06 RX ORDER — AMLODIPINE AND BENAZEPRIL HYDROCHLORIDE 10; 20 MG/1; MG/1
1 CAPSULE ORAL DAILY
Qty: 90 CAPSULE | Refills: 1 | Status: SHIPPED | OUTPATIENT
Start: 2024-11-06

## 2025-03-04 ENCOUNTER — TELEPHONE (OUTPATIENT)
Age: 75
End: 2025-03-04

## 2025-03-04 NOTE — TELEPHONE ENCOUNTER
[facility] called to schedule an appointment for patient for clearance for upcoming surgery on 03/24. I was unable to find any appointments prior to that.    Please call patient back.

## 2025-03-06 NOTE — TELEPHONE ENCOUNTER
Form faxed to [facility] and scanned into media.   Type of surgery: Bilateral L 2/3  Fax completed form to [phone]

## 2025-03-10 NOTE — TELEPHONE ENCOUNTER
"Phone call from patient concerned that \"he hasn't received a call for a clearance appt (surgery in 2 weeks)\". Informed patient that according to notes clearance was completed, and sent to provider. In addition informed patient that \"if appt would be needed office would contact him\". Patient agreeable.       "

## 2025-04-25 ENCOUNTER — TELEPHONE (OUTPATIENT)
Dept: NEPHROLOGY | Facility: CLINIC | Age: 75
End: 2025-04-25

## 2025-04-25 NOTE — TELEPHONE ENCOUNTER
Called patient and left a voicemail asking to please have blood work drawn before the follow up appointment.

## 2025-04-28 LAB
ANION GAP SERPL CALCULATED.3IONS-SCNC: 12 MMOL/L (ref 3–11)
BUN SERPL-MCNC: 31 MG/DL (ref 7–28)
CALCIUM SERPL-MCNC: 9.3 MG/DL (ref 8.5–10.5)
CHLORIDE SERPL-SCNC: 106 MMOL/L (ref 100–109)
CO2 SERPL-SCNC: 22 MMOL/L (ref 21–31)
CREAT SERPL-MCNC: 2.85 MG/DL (ref 0.53–1.3)
CYTOLOGY CMNT CVX/VAG CYTO-IMP: ABNORMAL
GFR/BSA.PRED SERPLBLD CYS-BASED-ARV: 23 ML/MIN/{1.73_M2}
GLUCOSE SERPL-MCNC: 97 MG/DL (ref 65–99)
POTASSIUM SERPL-SCNC: 4.7 MMOL/L (ref 3.5–5.2)
SODIUM SERPL-SCNC: 140 MMOL/L (ref 135–145)

## 2025-05-02 ENCOUNTER — OFFICE VISIT (OUTPATIENT)
Dept: NEPHROLOGY | Facility: CLINIC | Age: 75
End: 2025-05-02
Payer: MEDICARE

## 2025-05-02 VITALS
HEART RATE: 70 BPM | BODY MASS INDEX: 30.05 KG/M2 | DIASTOLIC BLOOD PRESSURE: 80 MMHG | WEIGHT: 187 LBS | SYSTOLIC BLOOD PRESSURE: 146 MMHG | HEIGHT: 66 IN

## 2025-05-02 DIAGNOSIS — I10 HYPERTENSION, UNSPECIFIED TYPE: ICD-10-CM

## 2025-05-02 DIAGNOSIS — N18.9 CHRONIC KIDNEY DISEASE, UNSPECIFIED CKD STAGE: Primary | ICD-10-CM

## 2025-05-02 PROCEDURE — 99214 OFFICE O/P EST MOD 30 MIN: CPT | Performed by: INTERNAL MEDICINE

## 2025-05-02 RX ORDER — LABETALOL 200 MG/1
200 TABLET, FILM COATED ORAL 2 TIMES DAILY
Qty: 180 TABLET | Refills: 3 | Status: SHIPPED | OUTPATIENT
Start: 2025-05-02

## 2025-05-02 RX ORDER — AMLODIPINE AND BENAZEPRIL HYDROCHLORIDE 10; 20 MG/1; MG/1
1 CAPSULE ORAL DAILY
Qty: 90 CAPSULE | Refills: 3 | Status: SHIPPED | OUTPATIENT
Start: 2025-05-02

## 2025-05-02 NOTE — ASSESSMENT & PLAN NOTE
Lab Results   Component Value Date    EGFR 23 (L) 03/13/2025    EGFR 21 (L) 11/20/2024    EGFR 25 (L) 08/29/2024    CREATININE 2.78 (H) 03/13/2025    CREATININE 3.04 (H) 11/20/2024    CREATININE 2.65 (H) 08/29/2024     Chronic kidney disease due to hypertensive nephrosclerosis as he had severe difficult to control hypertension for many decades.  There was insignificant proteinuria in the past.  When I first met him in 2016 his creatinine was 2.4 and it is now 2.7 so there really has not been significant progression.  To manage this routine health management maintaining good weight blood pressure control are of the utmost importance.  His blood pressure is now under good control.    There has been no progression so we will continue to monitor with no changes.  Patient requested to be seen yearly which I think is okay as I have known for many years and the exam is stable.  I will check a BMP senior care and then prior to visit and of course told him to call me if there is any problems.

## 2025-05-02 NOTE — ASSESSMENT & PLAN NOTE
Patient has significant hypertension presently on labetalol and Lotrel his blood pressure is under good control.  He will continue these current medications with no changes.    I have spent a total time of 30 minutes in caring for this patient on the day of the visit/encounter including Diagnostic results, Risk factor reductions, Impressions, Reviewing/placing orders in the medical record (including tests, medications, and/or procedures), and Obtaining or reviewing history  .

## 2025-05-02 NOTE — LETTER
May 2, 2025     Magdalena Pickens DO  [address] [facility]  [address] [place] [id number]    Patient: Rakesh Spencer   YOB: 1950   Date of Visit: 2025       Dear Dr. Magdalena Pickens DO:    Thank you for referring Rakesh Spencer to me for evaluation. Below are my notes for this consultation.    If you have questions, please do not hesitate to call me. I look forward to following your patient along with you.         Sincerely,        Christopher Alex MD        CC: No Recipients    Christopher Alex MD  2025  1:20 PM  Sign when Signing Visit  Name: Rakesh Spencer      : 1950      MRN: [id number]  Encounter Provider: Christopher Alex MD  Encounter Date: 2025   Encounter department: [facility]  :  Assessment & Plan  Chronic kidney disease, unspecified CKD stage  Lab Results   Component Value Date    EGFR 23 (L) 2025    EGFR 21 (L) 2024    EGFR 25 (L) 2024    CREATININE 2.78 (H) 2025    CREATININE 3.04 (H) 2024    CREATININE 2.65 (H) 2024     Chronic kidney disease due to hypertensive nephrosclerosis as he had severe difficult to control hypertension for many decades.  There was insignificant proteinuria in the past.  When I first met him in  his creatinine was 2.4 and it is now 2.7 so there really has not been significant progression.  To manage this routine health management maintaining good weight blood pressure control are of the utmost importance.  His blood pressure is now under good control.    There has been no progression so we will continue to monitor with no changes.  Patient requested to be seen yearly which I think is okay as I have known for many years and the exam is stable.  I will check a BMP FDC and then prior to visit and of course told him to call me if there is any problems.         Hypertension, unspecified type    Patient has significant hypertension presently on labetalol and Lotrel his blood  "pressure is under good control.  He will continue these current medications with no changes.    I have spent a total time of 30 minutes in caring for this patient on the day of the visit/encounter including Diagnostic results, Risk factor reductions, Impressions, Reviewing/placing orders in the medical record (including tests, medications, and/or procedures), and Obtaining or reviewing history  .              History of Present Illness  Hypertension  Pertinent negatives include no chest pain or shortness of breath.     Rakesh Spencer is a 74 y.o. male who presents for routine follow-up.  The patient recently had lumbar surgery due to lumbar stenosis.  He is now rehabbing and slowly getting back to his feet and walking although he is still limited.  He denies significant pain.  No other complaints for me.  History obtained from: patient    Review of Systems   Constitutional:  Negative for chills, diaphoresis and fever.   HENT: Negative.     Eyes: Negative.    Respiratory:  Negative for cough, chest tightness, shortness of breath and wheezing.    Cardiovascular:  Negative for chest pain and leg swelling.   Gastrointestinal:  Negative for abdominal pain, diarrhea, nausea and vomiting.   Genitourinary: Negative.    Musculoskeletal:  Positive for back pain.   Psychiatric/Behavioral:  Negative for agitation, behavioral problems and confusion.           Objective  Ht 5' 6\" (1.676 m)   Wt 84.8 kg (187 lb)   BMI 30.18 kg/m²      Physical Exam  Constitutional:       General: He is not in acute distress.     Appearance: He is not toxic-appearing.   HENT:      Head: Normocephalic and atraumatic.      Nose: Nose normal.      Mouth/Throat:      Mouth: Mucous membranes are moist.   Eyes:      Extraocular Movements: Extraocular movements intact.   Cardiovascular:      Rate and Rhythm: Normal rate and regular rhythm.      Heart sounds:      No gallop.   Pulmonary:      Effort: Pulmonary effort is normal. No respiratory distress. "      Breath sounds: No wheezing or rales.   Abdominal:      General: Bowel sounds are normal. There is no distension.      Palpations: Abdomen is soft.      Tenderness: There is no abdominal tenderness.   Neurological:      Mental Status: He is alert and oriented to person, place, and time.   Psychiatric:         Mood and Affect: Mood normal.         Behavior: Behavior normal.

## 2025-05-02 NOTE — PROGRESS NOTES
Name: Rakesh Spencer      : 1950      MRN: [id number]  Encounter Provider: Christopher Alex MD  Encounter Date: 2025   Encounter department: [facility] [place]  :  Assessment & Plan  Chronic kidney disease, unspecified CKD stage  Lab Results   Component Value Date    EGFR 23 (L) 2025    EGFR 21 (L) 2024    EGFR 25 (L) 2024    CREATININE 2.78 (H) 2025    CREATININE 3.04 (H) 2024    CREATININE 2.65 (H) 2024     Chronic kidney disease due to hypertensive nephrosclerosis as he had severe difficult to control hypertension for many decades.  There was insignificant proteinuria in the past.  When I first met him in  his creatinine was 2.4 and it is now 2.7 so there really has not been significant progression.  To manage this routine health management maintaining good weight blood pressure control are of the utmost importance.  His blood pressure is now under good control.    There has been no progression so we will continue to monitor with no changes.  Patient requested to be seen yearly which I think is okay as I have known for many years and the exam is stable.  I will check a BMP assisted and then prior to visit and of course told him to call me if there is any problems.         Hypertension, unspecified type    Patient has significant hypertension presently on labetalol and Lotrel his blood pressure is under good control.  He will continue these current medications with no changes.    I have spent a total time of 30 minutes in caring for this patient on the day of the visit/encounter including Diagnostic results, Risk factor reductions, Impressions, Reviewing/placing orders in the medical record (including tests, medications, and/or procedures), and Obtaining or reviewing history  .              History of Present Illness   Hypertension  Pertinent negatives include no chest pain or shortness of breath.     Rakesh Spencer is a 74 y.o. male who  "presents for routine follow-up.  The patient recently had lumbar surgery due to lumbar stenosis.  He is now rehabbing and slowly getting back to his feet and walking although he is still limited.  He denies significant pain.  No other complaints for me.  History obtained from: patient    Review of Systems   Constitutional:  Negative for chills, diaphoresis and fever.   HENT: Negative.     Eyes: Negative.    Respiratory:  Negative for cough, chest tightness, shortness of breath and wheezing.    Cardiovascular:  Negative for chest pain and leg swelling.   Gastrointestinal:  Negative for abdominal pain, diarrhea, nausea and vomiting.   Genitourinary: Negative.    Musculoskeletal:  Positive for back pain.   Psychiatric/Behavioral:  Negative for agitation, behavioral problems and confusion.           Objective   Ht 5' 6\" (1.676 m)   Wt 84.8 kg (187 lb)   BMI 30.18 kg/m²      Physical Exam  Constitutional:       General: He is not in acute distress.     Appearance: He is not toxic-appearing.   HENT:      Head: Normocephalic and atraumatic.      Nose: Nose normal.      Mouth/Throat:      Mouth: Mucous membranes are moist.   Eyes:      Extraocular Movements: Extraocular movements intact.   Cardiovascular:      Rate and Rhythm: Normal rate and regular rhythm.      Heart sounds:      No gallop.   Pulmonary:      Effort: Pulmonary effort is normal. No respiratory distress.      Breath sounds: No wheezing or rales.   Abdominal:      General: Bowel sounds are normal. There is no distension.      Palpations: Abdomen is soft.      Tenderness: There is no abdominal tenderness.   Neurological:      Mental Status: He is alert and oriented to person, place, and time.   Psychiatric:         Mood and Affect: Mood normal.         Behavior: Behavior normal.           "

## 2025-05-02 NOTE — PATIENT INSTRUCTIONS
You are here for follow-up you just had the lumbar back surgery and you are recovering from that.  Your blood pressure is under good control.  And you even made a comment that it was a little high here so that means it is running great.  Continue those current medications.    Your creatinine which is the blood test for the kidney function is stable at 2.7 there is been no worsening over the last few years and in fact going back 10 years it is only slightly higher so it was due to high blood pressure injuring the kidney now though her blood pressure is under good control you should have very slow changes and again things are stable.    We can follow-up in a year as we discussed but you can do blood work in 6 months just to make sure things are stable and I will call you with those results.  Of course contact me if you have any questions or problems before next visit.